# Patient Record
Sex: MALE | Race: WHITE | Employment: PART TIME | URBAN - METROPOLITAN AREA
[De-identification: names, ages, dates, MRNs, and addresses within clinical notes are randomized per-mention and may not be internally consistent; named-entity substitution may affect disease eponyms.]

---

## 2017-01-09 RX ORDER — HYDROCHLOROTHIAZIDE 25 MG/1
25 TABLET ORAL DAILY
Qty: 90 TAB | Refills: 3 | Status: SHIPPED | OUTPATIENT
Start: 2017-01-09 | End: 2017-01-11

## 2017-01-11 ENCOUNTER — OFFICE VISIT (OUTPATIENT)
Dept: CARDIOLOGY CLINIC | Age: 67
End: 2017-01-11

## 2017-01-11 VITALS
RESPIRATION RATE: 18 BRPM | SYSTOLIC BLOOD PRESSURE: 148 MMHG | DIASTOLIC BLOOD PRESSURE: 86 MMHG | OXYGEN SATURATION: 99 % | HEIGHT: 66 IN | WEIGHT: 209.2 LBS | BODY MASS INDEX: 33.62 KG/M2 | HEART RATE: 80 BPM

## 2017-01-11 DIAGNOSIS — I25.10 CORONARY ARTERY DISEASE INVOLVING NATIVE CORONARY ARTERY OF NATIVE HEART WITHOUT ANGINA PECTORIS: Primary | ICD-10-CM

## 2017-01-11 DIAGNOSIS — I10 ESSENTIAL HYPERTENSION, BENIGN: ICD-10-CM

## 2017-01-11 DIAGNOSIS — E78.5 DYSLIPIDEMIA: ICD-10-CM

## 2017-01-11 RX ORDER — AMLODIPINE BESYLATE 5 MG/1
5 TABLET ORAL DAILY
Qty: 30 TAB | Refills: 5 | Status: SHIPPED | OUTPATIENT
Start: 2017-01-11 | End: 2017-07-10 | Stop reason: SDUPTHER

## 2017-01-11 RX ORDER — HYDROCHLOROTHIAZIDE 25 MG/1
25 TABLET ORAL DAILY
COMMUNITY
End: 2018-01-08 | Stop reason: SDUPTHER

## 2017-01-11 RX ORDER — ATORVASTATIN CALCIUM 40 MG/1
TABLET, FILM COATED ORAL
COMMUNITY
End: 2017-12-27 | Stop reason: DRUGHIGH

## 2017-01-11 RX ORDER — AMLODIPINE BESYLATE 5 MG/1
5 TABLET ORAL DAILY
COMMUNITY
End: 2017-01-11 | Stop reason: SDUPTHER

## 2017-01-11 NOTE — PATIENT INSTRUCTIONS
AxioMx Activation    Thank you for requesting access to AxioMx. Please follow the instructions below to securely access and download your online medical record. AxioMx allows you to send messages to your doctor, view your test results, renew your prescriptions, schedule appointments, and more. How Do I Sign Up? 1. In your internet browser, go to [email or web address].[email or web address]  2. Click on the First Time User? Click Here link in the Sign In box. You will be redirect to the New Member Sign Up page. 3. Enter your AxioMx Access Code exactly as it appears below. You will not need to use this code after youve completed the sign-up process. If you do not sign up before the expiration date, you must request a new code. AxioMx Access Code: [email or web address]  Expires: 2017 10:49 AM (This is the date your AxioMx access code will )    4. Enter the last four digits of your Social Security Number (xxxx) and Date of Birth (mm/dd/yyyy) as indicated and click Submit. You will be taken to the next sign-up page. 5. Create a AxioMx ID. This will be your AxioMx login ID and cannot be changed, so think of one that is secure and easy to remember. 6. Create a AxioMx password. You can change your password at any time. 7. Enter your Password Reset Question and Answer. This can be used at a later time if you forget your password. 8. Enter your e-mail address. You will receive e-mail notification when new information is available in [address] 9. Click Sign Up. You can now view and download portions of your medical record. 10. Click the Download Summary menu link to download a portable copy of your medical information. Additional Information    If you have questions, please visit the Frequently Asked Questions section of the AxioMx website at https://[employer]. [employer]. Gotta'go Personal Care Device/[employer]/. Remember, AxioMx is NOT to be used for urgent needs. For medical emergencies, dial 911.            Learning About Coronary Artery Disease (CAD)  What is coronary artery disease? Coronary artery disease (CAD) occurs when plaque builds up in the arteries that bring oxygen-rich blood to your heart. Plaque is a fatty substance made of cholesterol, calcium, and other substances in the blood. This process is called hardening of the arteries, or atherosclerosis. What happens when you have coronary artery disease? · Plaque may narrow the coronary arteries. Narrowed arteries cause poor blood flow. This can lead to angina symptoms such as chest pain or discomfort. If blood flow is completely blocked, you could have a heart attack. · You can slow CAD and reduce the risk of future problems by making changes in your lifestyle. These include quitting smoking and eating heart-healthy foods. · Treatments for CAD, along with changes in your lifestyle, can help you live a longer and healthier life. How can you prevent coronary artery disease? · Do not smoke. It may be the best thing you can do to prevent heart disease. If you need help quitting, talk to your doctor about stop-smoking programs and medicines. These can increase your chances of quitting for good. · Be active. Get at least 30 minutes of exercise on most days of the week. Walking is a good choice. You also may want to do other activities, such as running, swimming, cycling, or playing tennis or team sports. · Eat heart-healthy foods. Eat more fruits and vegetables and less foods that contain saturated and trans fats. Limit alcohol, sodium, and sweets. · Stay at a healthy weight. Lose weight if you need to. · Manage other health problems such as diabetes, high blood pressure, and high cholesterol. · Manage stress. Stress can hurt your heart. To keep stress low, talk about your problems and feelings. Don't keep your feelings hidden. · If you have talked about it with your doctor, take a low-dose aspirin every day.  Aspirin can help certain people lower their risk of a heart attack or stroke. But taking aspirin isn't right for everyone, because it can cause serious bleeding. Do not start taking daily aspirin unless your doctor knows about it. How is coronary artery disease treated? · Your doctor will suggest that you make lifestyle changes. For example, your doctor may ask you to eat healthy foods, quit smoking, lose extra weight, and be more active. · You will have to take medicines. · Your doctor may suggest a procedure to open narrowed or blocked arteries. This is called angioplasty. Or your doctor may suggest using healthy blood vessels to create detours around narrowed or blocked arteries. This is called bypass surgery. Follow-up care is a key part of your treatment and safety. Be sure to make and go to all appointments, and call your doctor if you are having problems. It's also a good idea to know your test results and keep a list of the medicines you take. Where can you learn more? Go to http://raul-lotus.info/. Enter [phone] in the search box to learn more about \"Learning About Coronary Artery Disease (CAD). \"  Current as of: January 27, 2016  Content Version: 11.1  © 6955-2450 [employer]. Care instructions adapted under license by [employer] (which disclaims liability or warranty for this information). If you have questions about a medical condition or this instruction, always ask your healthcare professional. [address] any warranty or liability for your use of this information.

## 2017-01-11 NOTE — MR AVS SNAPSHOT
Visit Information Date & Time Provider Department Dept. Phone Encounter #  
 1/11/2017 11:40 AM Kamaljit Nick MD [facility] Brissa Roper [phone] [id number] Your Appointments 4/19/2017 10:40 AM  
ESTABLISHED PATIENT with Kamaljit Nick MD  
[facility] ([facility] SCHEDULING) Appt Note: 1 year f/u  
 [address] [facility] 600 1007 [address] [facility] [address] [address] Upcoming Health Maintenance Date Due Hepatitis C Screening 1950 DTaP/Tdap/Td series (1 - Tdap) 9/14/1969 FOBT Q 1 YEAR AGE 50-75 9/14/1998 ZOSTER VACCINE AGE 60> 9/14/2008 GLAUCOMA SCREENING Q2Y 9/14/2013 Pneumococcal 65+ Low/Medium Risk (1 of 2 - PCV13) 9/14/2013 MEDICARE YEARLY EXAM 9/14/2013 INFLUENZA AGE 9 TO ADULT 8/1/2016 Allergies as of 1/11/2017  Review Complete On: 1/11/2017 By: Kamaljit Nick MD  
 Not on File Current Immunizations  Never Reviewed No immunizations on file. Not reviewed this visit Vitals BP Pulse Resp Height(growth percentile) Weight(growth percentile) SpO2  
 148/86 (BP 1 Location: Left arm) 80 18 5' 6\" (1.676 m) 209 lb 3.2 oz (94.9 kg) 99% BMI Smoking Status 33.77 kg/m2 Former Smoker Vitals History BMI and BSA Data Body Mass Index Body Surface Area  
 33.77 kg/m 2 2.1 m 2 Preferred Pharmacy Pharmacy Name Phone [facility], [address] [phone] Your Updated Medication List  
  
   
This list is accurate as of: 1/11/17 12:03 PM.  Always use your most recent med list.  
  
  
  
  
 albuterol 2.5 mg /3 mL (0.083 %) nebulizer solution Commonly known as:  PROVENTIL VENTOLIN  
by Nebulization route as needed. amLODIPine 5 mg tablet Commonly known as:  Maria C Sal Take 1 Tab by mouth daily. aspirin 325 mg tablet Commonly known as:  ASPIRIN Take 325 mg by mouth daily. hydroCHLOROthiazide 25 mg tablet Commonly known as:  HYDRODIURIL Take 25 mg by mouth daily. LIPITOR 40 mg tablet Generic drug:  atorvastatin Take one tablet Monday, Wednesday and Friday. Introducing \[facility]\"" & HEALTH SERVICES! Vinicius Layne introduces Yuan[employer]~Flowâ„¢ patient portal. Now you can access parts of your medical record, email your doctor's office, and request medication refills online. 1. In your internet browser, go to [email or web address] Mobile 2. Click on the First Time User? Click Here link in the Sign In box. You will see the New Member Sign Up page. 3. Enter your Yuanfen~Flowâ„¢ Access Code exactly as it appears below. You will not need to use this code after youve completed the sign-up process. If you do not sign up before the expiration date, you must request a new code. · Yuanfen~Flowâ„¢ Access Code: [email or web address] Expires: 2/21/2017 10:49 AM 
 
4. Enter the last four digits of your Social Security Number (xxxx) and Date of Birth (mm/dd/yyyy) as indicated and click Submit. You will be taken to the next sign-up page. 5. Create a Yuanfen~Flowâ„¢ ID. This will be your Yuanfen~Flowâ„¢ login ID and cannot be changed, so think of one that is secure and easy to remember. 6. Create a Yuanfen~Flowâ„¢ password. You can change your password at any time. 7. Enter your Password Reset Question and Answer. This can be used at a later time if you forget your password. 8. Enter your e-mail address. You will receive e-mail notification when new information is available in [address] 9. Click Sign Up. You can now view and download portions of your medical record. 10. Click the Download Summary menu link to download a portable copy of your medical information. If you have questions, please visit the Frequently Asked Questions section of the Yuanfen~Flowâ„¢ website.  Remember, Yuanfen~Flowâ„¢ is NOT to be used for urgent needs. For medical emergencies, dial 911. Now available from your iPhone and Android! Please provide this summary of care documentation to your next provider. Your primary care clinician is listed as Angi Cruz. If you have any questions after today's visit, please call [phone].

## 2017-01-11 NOTE — PROGRESS NOTES
Subjective:     Problem List  Date Reviewed: 1/11/2017          Codes Class Noted    Coronary artery disease ICD-10-CM: I25.10  ICD-9-CM: 414.00  1/24/2011    Overview Addendum 1/7/2013  2:00 PM by MD yanely Harley. CABG (Dean Herron): LIMA-LAD, SVG-Diag, SVG-OM. b. Echo (4/1/9): EF 55%. C.  Echo (11/20/12):  EF 50-55%, pseudo. Clayborne Shank (12/2012): Fixed inferolat defect. No reversible defects. EF 57%. Dyslipidemia ICD-10-CM: E78.5  ICD-9-CM: 272.4  1/24/2011    Overview Addendum 7/16/2014 11:26 AM by MD yanely Harley. FLP (4/2/9): Tot 157, , HDL 55, LDL 78 (Vytorin 10/80 mg qd). B.  FLP (11/11/10): Tot 141, , HDL 59, LDL 58 (Vytorin 10/80mg qd). C.  FLP (11/21/13): Tot 165, TG 74, HDL 42, LDL 82 (Lipitor 40). Essential hypertension, benign ICD-10-CM: I10  ICD-9-CM: 401.1  1/24/2011              Mr. Ruben Overton is a 74 y.o. man with the above past medical history, who presents for follow up of his coronary artery disease   He is doing well from a cardiac standpoint. He has noted that his blood pressure has been elevated for the past several months. It has been anywhere from 746 to 989 systolic. He denies any chest pain, chest discomfort, shortness of breath, dyspnea on exertion, orthopnea, paroxysmal nocturnal dyspnea, lower extremity swelling, palpitations, syncope or near syncope. He has just been getting a stress test done as it has been about 19 years since his bypass surgery. History   Smoking Status    Former Smoker    Years: 28.00    Quit date: 1/24/1970   Smokeless Tobacco    Not on file       Current Outpatient Prescriptions   Medication Sig Dispense Refill    atorvastatin (LIPITOR) 40 mg tablet Take one tablet Monday, Wednesday and Friday.  hydroCHLOROthiazide (HYDRODIURIL) 25 mg tablet Take 25 mg by mouth daily.       albuterol (PROVENTIL VENTOLIN) 2.5 mg /3 mL (0.083 %) nebulizer solution by Nebulization route as needed.  aspirin (ASPIRIN) 325 mg tablet Take 325 mg by mouth daily. Objective:     Visit Vitals    /86 (BP 1 Location: Left arm)    Pulse 80    Resp 18    Ht 5' 6\" (1.676 m)    Wt 209 lb 3.2 oz (94.9 kg)    SpO2 99%    BMI 33.77 kg/m2       HEENT Exam:     Normocephalic, atraumatic. EOMI. Oropharynx negative. Neck supple. No lymphadenopathy. Lung Exam:     The patient is not dyspneic. There is no cough. The lungs are clear to percussion. Breath sounds are heard equally in all lung fields. There are no wheezes, rales, rhonchi, or rubs heard on auscultation. Heart Exam:     The rhythm is regular. The PMI is in the 5th intercostal space of the MCL. Apical impulse is normal. S1 is regular. S2 is physiologic. There is no S3, S4 gallop, murmur, click, or rub. Abdomen Exam:     Bowel sounds are normoactive. Abdomen benign. Extremities Exam:     The extremities are atraumatic appearing. There is no clubbing, cyanosis, edema, ulcers, varicose veins, rash, swelling, erythemia noted in the extremities. The neurovascular status is grossly intact with normal distal sensation and pulses. Vascular Exam:     The radial, brachial, dorsalis pedis, posterior tibial, are equal and strong bilaterally The carotids are equal bilaterally without bruits. EKG: Catrachito Da Silva Assessment/Plan:     Mr. Michaelle Redmond appears stable from a cardiac standpoint. He is almost two decades out from his bypass surgery so we are going to try to obtain an exercise Cardiolite Nuclear Stress Test to ensure he does not have any large areas of ischemia. We are also going to start him on Norvasc 5 mg per day. He is going to follow up with me in April at his regularly scheduled appointment. He will keep a blood pressure diary at home and call me with the results, as well as bring them in if and when he has the stress test.  We discussed diet and exercise. Plan:  1.  Continue outpatient medication regimen. 2. Start Norvasc 5 mg per day. 3. Exercise Cardiolite Nuclear Stress Test.   4. Follow up with me in April or sooner pending the results of #3.  5. Diet and exercise. 6. Call my office, call his primary care physician, or return to the hospital should any concerning symptomatology arise. Mr. Raul Mendoza indicated that he understood this plan and wished to proceed ahead.             Patient Care Team:  Leighann Gentile MD as PCP - General (Internal Medicine)  Modesto Giron MD as Physician (Cardiology)

## 2017-01-18 ENCOUNTER — TELEPHONE (OUTPATIENT)
Dept: CARDIOLOGY CLINIC | Age: 67
End: 2017-01-18

## 2017-01-18 NOTE — TELEPHONE ENCOUNTER
Please return call at [phone]. When pt calls, please inform him NPO after 6am Thurs Jan 19, 2017, no caffiene of any kind after 8pm this evening, and test is approximately 3-4 hours.

## 2017-01-19 ENCOUNTER — CLINICAL SUPPORT (OUTPATIENT)
Dept: CARDIOLOGY CLINIC | Age: 67
End: 2017-01-19

## 2017-01-19 DIAGNOSIS — I10 ESSENTIAL HYPERTENSION, BENIGN: ICD-10-CM

## 2017-01-19 DIAGNOSIS — E78.5 DYSLIPIDEMIA: ICD-10-CM

## 2017-01-19 DIAGNOSIS — I25.10 CORONARY ARTERY DISEASE INVOLVING NATIVE CORONARY ARTERY OF NATIVE HEART WITHOUT ANGINA PECTORIS: Primary | ICD-10-CM

## 2017-01-19 NOTE — PROGRESS NOTES
See scanned report. Dr. Amberly Brannon ordered and Dr. Amberly Brannon read study. ID verified per protocol.

## 2017-04-14 ENCOUNTER — DOCUMENTATION ONLY (OUTPATIENT)
Dept: CARDIOLOGY CLINIC | Age: 67
End: 2017-04-14

## 2017-07-10 RX ORDER — AMLODIPINE BESYLATE 5 MG/1
5 TABLET ORAL DAILY
Qty: 30 TAB | Refills: 3 | Status: SHIPPED | OUTPATIENT
Start: 2017-07-10 | End: 2017-10-09 | Stop reason: SDUPTHER

## 2017-07-10 NOTE — TELEPHONE ENCOUNTER
Refill is per verbal order of Dr. Yifan Martinez. Requested Prescriptions     Pending Prescriptions Disp Refills    amLODIPine (NORVASC) 5 mg tablet [Pharmacy Med Name: amLODIPine BESYLATE 5 MG TAB] 30 Tab 3     Sig: Take 1 Tab by mouth daily.

## 2017-10-09 RX ORDER — AMLODIPINE BESYLATE 5 MG/1
5 TABLET ORAL DAILY
Qty: 30 TAB | Refills: 3 | Status: SHIPPED | OUTPATIENT
Start: 2017-10-09 | End: 2018-01-08 | Stop reason: SDUPTHER

## 2017-12-08 ENCOUNTER — OFFICE VISIT (OUTPATIENT)
Dept: CARDIOLOGY CLINIC | Age: 67
End: 2017-12-08

## 2017-12-08 VITALS
RESPIRATION RATE: 18 BRPM | OXYGEN SATURATION: 98 % | SYSTOLIC BLOOD PRESSURE: 128 MMHG | WEIGHT: 210 LBS | BODY MASS INDEX: 33.75 KG/M2 | DIASTOLIC BLOOD PRESSURE: 60 MMHG | HEIGHT: 66 IN

## 2017-12-08 DIAGNOSIS — I25.10 CORONARY ARTERY DISEASE INVOLVING NATIVE CORONARY ARTERY OF NATIVE HEART WITHOUT ANGINA PECTORIS: Primary | ICD-10-CM

## 2017-12-08 DIAGNOSIS — I10 ESSENTIAL HYPERTENSION, BENIGN: ICD-10-CM

## 2017-12-08 DIAGNOSIS — E78.5 DYSLIPIDEMIA: ICD-10-CM

## 2017-12-08 NOTE — MR AVS SNAPSHOT
Visit Information Date & Time Provider Department Dept. Phone Encounter #  
 12/8/2017  3:00 PM John Lobo MD [facility] Debi Anderson [phone] [id number] Your Appointments 12/12/2018  8:40 AM  
ESTABLISHED PATIENT with John Lobo MD  
[facility] ([facility] SCHEDULING) Appt Note: annual  
 [address] [facility] [place] 600 1007 [facility]  
[address] [facility] [place] [address] [address] Upcoming Health Maintenance Date Due Hepatitis C Screening 1950 DTaP/Tdap/Td series (1 - Tdap) 9/14/1969 FOBT Q 1 YEAR AGE 50-75 9/14/1998 ZOSTER VACCINE AGE 60> 7/14/2008 GLAUCOMA SCREENING Q2Y 9/14/2013 Pneumococcal 65+ Low/Medium Risk (1 of 2 - PCV13) 9/14/2013 MEDICARE YEARLY EXAM 9/14/2013 Influenza Age 5 to Adult 8/1/2017 Allergies as of 12/8/2017  Review Complete On: 12/8/2017 By: John Lobo MD  
 No Known Allergies Current Immunizations  Never Reviewed No immunizations on file. Not reviewed this visit You Were Diagnosed With   
  
 Codes Comments Coronary artery disease involving native coronary artery of native heart without angina pectoris    -  Primary ICD-10-CM: I25.10 ICD-9-CM: 414.01 Vitals BP Resp Height(growth percentile) Weight(growth percentile) SpO2 BMI  
 128/60 (BP 1 Location: Left arm, BP Patient Position: Sitting) 18 5' 6\" (1.676 m) 210 lb (95.3 kg) 98% 33.89 kg/m2 Smoking Status Former Smoker Vitals History BMI and BSA Data Body Mass Index Body Surface Area  
 33.89 kg/m 2 2.11 m 2 Preferred Pharmacy Pharmacy Name Phone [facility], [address] [phone] Your Updated Medication List  
  
   
This list is accurate as of: 12/8/17  3:34 PM.  Always use your most recent med list.  
  
  
  
  
 albuterol 2.5 mg /3 mL (0.083 %) nebulizer solution Commonly known as:  PROVENTIL VENTOLIN  
by Nebulization route as needed. amLODIPine 5 mg tablet Commonly known as:  [place] Take 1 Tab by mouth daily. aspirin 325 mg tablet Commonly known as:  ASPIRIN Take 325 mg by mouth daily. hydroCHLOROthiazide 25 mg tablet Commonly known as:  HYDRODIURIL Take 25 mg by mouth daily. LIPITOR 40 mg tablet Generic drug:  atorvastatin Take one tablet Monday, Wednesday and Friday. We Performed the Following AMB POC EKG ROUTINE W/ 12 LEADS, INTER & REP [27500 CPT(R)] Introducing [facility] & HEALTH SERVICES! Corine Espinoza introduces Immunexpress patient portal. Now you can access parts of your medical record, email your doctor's office, and request medication refills online. 1. In your internet browser, go to [email or web address] 2. Click on the First Time User? Click Here link in the Sign In box. You will see the New Member Sign Up page. 3. Enter your Immunexpress Access Code exactly as it appears below. You will not need to use this code after youve completed the sign-up process. If you do not sign up before the expiration date, you must request a new code. · Immunexpress Access Code: [email or web address] Expires: 3/8/2018  3:34 PM 
 
4. Enter the last four digits of your Social Security Number (xxxx) and Date of Birth (mm/dd/yyyy) as indicated and click Submit. You will be taken to the next sign-up page. 5. Create a "[employer]t ID. This will be your Immunexpress login ID and cannot be changed, so think of one that is secure and easy to remember. 6. Create a Immunexpress password. You can change your password at any time. 7. Enter your Password Reset Question and Answer. This can be used at a later time if you forget your password. 8. Enter your e-mail address. You will receive e-mail notification when new information is available in [address] 9. Click Sign Up. You can now view and download portions of your medical record. 10. Click the Download Summary menu link to download a portable copy of your medical information. If you have questions, please visit the Frequently Asked Questions section of the EquityLancer website. Remember, EquityLancer is NOT to be used for urgent needs. For medical emergencies, dial 911. Now available from your iPhone and Android! Please provide this summary of care documentation to your next provider. Your primary care clinician is listed as Shwetha Hall. If you have any questions after today's visit, please call [phone].

## 2017-12-08 NOTE — PATIENT INSTRUCTIONS
Rocketboom Activation    Thank you for requesting access to Rocketboom. Please follow the instructions below to securely access and download your online medical record. Rocketboom allows you to send messages to your doctor, view your test results, renew your prescriptions, schedule appointments, and more. How Do I Sign Up? 1. In your internet browser, go to [email or web address]  2. Click on the First Time User? Click Here link in the Sign In box. You will be redirect to the New Member Sign Up page. 3. Enter your Rocketboom Access Code exactly as it appears below. You will not need to use this code after youve completed the sign-up process. If you do not sign up before the expiration date, you must request a new code. Rocketboom Access Code: [email or web address]  Expires: 3/8/2018  3:34 PM (This is the date your Rocketboom access code will )    4. Enter the last four digits of your Social Security Number (xxxx) and Date of Birth (mm/dd/yyyy) as indicated and click Submit. You will be taken to the next sign-up page. 5. Create a Rocketboom ID. This will be your Rocketboom login ID and cannot be changed, so think of one that is secure and easy to remember. 6. Create a Rocketboom password. You can change your password at any time. 7. Enter your Password Reset Question and Answer. This can be used at a later time if you forget your password. 8. Enter your e-mail address. You will receive e-mail notification when new information is available in [address]. 9. Click Sign Up. You can now view and download portions of your medical record. 10. Click the Download Summary menu link to download a portable copy of your medical information. Additional Information    If you have questions, please visit the Frequently Asked Questions section of the Rocketboom website at [email or web address] [employer]. [employer]Get[employer]NEUWAY [employer]/. Remember, Rocketboom is NOT to be used for urgent needs. For medical emergencies, dial 911.            Learning About Coronary Artery Disease (CAD)  What is coronary artery disease? Coronary artery disease (CAD) occurs when plaque builds up in the arteries that bring oxygen-rich blood to your heart. Plaque is a fatty substance made of cholesterol, calcium, and other substances in the blood. This process is called hardening of the arteries, or atherosclerosis. What happens when you have coronary artery disease? · Plaque may narrow the coronary arteries. Narrowed arteries cause poor blood flow. This can lead to angina symptoms such as chest pain or discomfort. If blood flow is completely blocked, you could have a heart attack. · You can slow CAD and reduce the risk of future problems by making changes in your lifestyle. These include quitting smoking and eating heart-healthy foods. · Treatments for CAD, along with changes in your lifestyle, can help you live a longer and healthier life. How can you prevent coronary artery disease? · Do not smoke. It may be the best thing you can do to prevent heart disease. If you need help quitting, talk to your doctor about stop-smoking programs and medicines. These can increase your chances of quitting for good. · Be active. Get at least 30 minutes of exercise on most days of the week. Walking is a good choice. You also may want to do other activities, such as running, swimming, cycling, or playing tennis or team sports. · Eat heart-healthy foods. Eat more fruits and vegetables and less foods that contain saturated and trans fats. Limit alcohol, sodium, and sweets. · Stay at a healthy weight. Lose weight if you need to. · Manage other health problems such as diabetes, high blood pressure, and high cholesterol. · Manage stress. Stress can hurt your heart. To keep stress low, talk about your problems and feelings. Don't keep your feelings hidden. · If you have talked about it with your doctor, take a low-dose aspirin every day.  Aspirin can help certain people lower their risk of a heart attack or stroke. But taking aspirin isn't right for everyone, because it can cause serious bleeding. Do not start taking daily aspirin unless your doctor knows about it. How is coronary artery disease treated? · Your doctor will suggest that you make lifestyle changes. For example, your doctor may ask you to eat healthy foods, quit smoking, lose extra weight, and be more active. · You will have to take medicines. · Your doctor may suggest a procedure to open narrowed or blocked arteries. This is called angioplasty. Or your doctor may suggest using healthy blood vessels to create detours around narrowed or blocked arteries. This is called bypass surgery. Follow-up care is a key part of your treatment and safety. Be sure to make and go to all appointments, and call your doctor if you are having problems. It's also a good idea to know your test results and keep a list of the medicines you take. Where can you learn more? Go to http://raul-lotus.info/. Enter [phone] in the search box to learn more about \"Learning About Coronary Artery Disease (CAD). \"  Current as of: September 21, 2016  Content Version: 11.4  © 8600-5127 [employer]. Care instructions adapted under license by [employer] (which disclaims liability or warranty for this information). If you have questions about a medical condition or this instruction, always ask your healthcare professional. [address] any warranty or liability for your use of this information.

## 2017-12-08 NOTE — PROGRESS NOTES
Isis Rich is a 69 y.o. male  Chief Complaint   Patient presents with    Coronary Artery Disease     .

## 2017-12-08 NOTE — PROGRESS NOTES
Subjective:     Problem List  Date Reviewed: 12/8/2017          Codes Class Noted    Coronary artery disease ICD-10-CM: I25.10  ICD-9-CM: 414.00  1/24/2011    Overview Addendum 1/23/2017 12:52 PM by Alisha Carvalho MD     a. CABG (Jelly Cabrera): LIMA-LAD, SVG-Diag, SVG-OM. b. Echo (4/1/9): EF 55%. C.  Echo (11/20/12):  EF 50-55%, pseudo. Alicia Rajas (12/2012): Fixed inferolat defect. No reversible defects. EF 57%. E.  Exercise Cardiolite (1/19/17): Large fixed inferolateral defect. No reversible defects. EF 53%. Dyslipidemia ICD-10-CM: E78.5  ICD-9-CM: 272.4  1/24/2011    Overview Addendum 7/16/2014 11:26 AM by Alisha Carvalho MD     a. FLP (4/2/9): Tot 157, , HDL 55, LDL 78 (Vytorin 10/80 mg qd). B.  FLP (11/11/10): Tot 141, , HDL 59, LDL 58 (Vytorin 10/80mg qd). C.  FLP (11/21/13): Tot 165, TG 74, HDL 42, LDL 82 (Lipitor 40). Essential hypertension, benign ICD-10-CM: I10  ICD-9-CM: 401.1  1/24/2011              Mr. Chuck Park is a 69 y.o. man with the above past medical history, who presents for follow up of his coronary artery disease. He is doing well from a cardiac standpoint. He denies any chest pain, chest discomfort, shortness of breath, dyspnea on exertion, orthopnea, paroxysmal nocturnal dyspnea, lower extremity swelling, palpitations, syncope or near syncope. His most recent lipids that I have are from March of this year. His LDL is suboptimal on his current regimen of Lipitor 40 mg Monday, Wednesday and Friday. History   Smoking Status    Former Smoker    Years: 28.00    Quit date: 1/24/1970   Smokeless Tobacco    Former User       Current Outpatient Prescriptions   Medication Sig Dispense Refill    amLODIPine (NORVASC) 5 mg tablet Take 1 Tab by mouth daily. 30 Tab 3    atorvastatin (LIPITOR) 40 mg tablet Take one tablet Monday, Wednesday and Friday.       hydroCHLOROthiazide (HYDRODIURIL) 25 mg tablet Take 25 mg by mouth daily.      albuterol (PROVENTIL VENTOLIN) 2.5 mg /3 mL (0.083 %) nebulizer solution by Nebulization route as needed.  aspirin (ASPIRIN) 325 mg tablet Take 325 mg by mouth daily. Objective:     Visit Vitals    /60 (BP 1 Location: Left arm, BP Patient Position: Sitting)    Resp 18    Ht 5' 6\" (1.676 m)    Wt 210 lb (95.3 kg)    SpO2 98%    BMI 33.89 kg/m2       HEENT Exam:     Normocephalic, atraumatic. EOMI. Oropharynx negative. Neck supple. No lymphadenopathy. Lung Exam:     The patient is not dyspneic. There is no cough. The lungs are clear to percussion. Breath sounds are heard equally in all lung fields. There are no wheezes, rales, rhonchi, or rubs heard on auscultation. Heart Exam:     The rhythm is regular. The PMI is in the 5th intercostal space of the MCL. Apical impulse is normal. S1 is regular. S2 is physiologic. There is no S3, S4 gallop, murmur, click, or rub. Abdomen Exam:     Bowel sounds are normoactive. Abdomen benign. Extremities Exam:     The extremities are atraumatic appearing. There is no clubbing, cyanosis, edema, ulcers, varicose veins, rash, erythemia noted in the extremities. The neurovascular status is grossly intact with normal distal sensation and pulses. Vascular Exam:     The radial, brachial, dorsalis pedis, posterior tibial, are equal and strong bilaterally The carotids are equal bilaterally without bruits. EKG: NSR @ 61, rSR' V1. Assessment/Plan:     Mr. Yancy Cabrera appears stable from a cardiac standpoint. We are going to recheck his fasting lipid profile with liver enzymes. If his LDL is still elevated we may try changing his Lipitor regimen to every day by changing him to Crestor at a lower dose. but every day. He will follow up in one year's time. He is to follow up with his primary care physician in March. We discussed diet and exercise. Plan:  1. Continue outpatient medication regimen. 2. Fasting lipid profile with liver enzymes. 3. Follow up in one year's time or sooner pending the results of #2.  4. Diet and exercise. 5. Call my office, call his primary care physician, or return to the hospital should any concerning symptomatology arise. Mr. Bing Braden indicated that he understood this plan and wished to proceed ahead.             Patient Care Team:  Annamaria Ferrer MD as PCP - General (Internal Medicine)  Ellie Ferrell MD as Physician (Cardiology)

## 2017-12-13 DIAGNOSIS — I25.10 CORONARY ARTERY DISEASE DUE TO LIPID RICH PLAQUE: Primary | ICD-10-CM

## 2017-12-13 DIAGNOSIS — I25.83 CORONARY ARTERY DISEASE DUE TO LIPID RICH PLAQUE: Primary | ICD-10-CM

## 2017-12-14 LAB
ALBUMIN SERPL-MCNC: 4.7 G/DL (ref 3.6–4.8)
ALP SERPL-CCNC: 58 IU/L (ref 39–117)
ALT SERPL-CCNC: 29 IU/L (ref 0–44)
AST SERPL-CCNC: 29 IU/L (ref 0–40)
BILIRUB DIRECT SERPL-MCNC: 0.33 MG/DL (ref 0–0.4)
BILIRUB SERPL-MCNC: 1.3 MG/DL (ref 0–1.2)
CHOLEST SERPL-MCNC: 217 MG/DL (ref 100–199)
HDLC SERPL-MCNC: 91 MG/DL
INTERPRETATION, 910389: NORMAL
LDLC SERPL CALC-MCNC: 114 MG/DL (ref 0–99)
PROT SERPL-MCNC: 7.2 G/DL (ref 6–8.5)
TRIGL SERPL-MCNC: 60 MG/DL (ref 0–149)
VLDLC SERPL CALC-MCNC: 12 MG/DL (ref 5–40)

## 2017-12-14 NOTE — PROGRESS NOTES
LDL suboptimal despite Lipitor 40mg M/W/F. I'd recommend either changing to Lipitor every day or changing to Crestor 20mg every day. His choice. Either way, L&L after 2 months. Thanks.

## 2017-12-20 DIAGNOSIS — I25.10 CORONARY ARTERY DISEASE DUE TO LIPID RICH PLAQUE: Primary | ICD-10-CM

## 2017-12-20 DIAGNOSIS — I25.83 CORONARY ARTERY DISEASE DUE TO LIPID RICH PLAQUE: Primary | ICD-10-CM

## 2017-12-22 ENCOUNTER — TELEPHONE (OUTPATIENT)
Dept: CARDIOLOGY CLINIC | Age: 67
End: 2017-12-22

## 2017-12-22 NOTE — TELEPHONE ENCOUNTER
Pt calling in regards to his lab results. He wants to know what the  recommends as far as starting a full regimen of his cholesterol medicine, again. Please give him a call back @ [phone], okay to leave voicemail. Thanks!   Juan Manuel Irwin

## 2017-12-27 RX ORDER — ATORVASTATIN CALCIUM 40 MG/1
40 TABLET, FILM COATED ORAL DAILY
Qty: 30 TAB | Refills: 5 | Status: SHIPPED | OUTPATIENT
Start: 2017-12-27 | End: 2018-01-08 | Stop reason: SDUPTHER

## 2017-12-27 RX ORDER — ATORVASTATIN CALCIUM 40 MG/1
TABLET, FILM COATED ORAL DAILY
COMMUNITY
End: 2017-12-27 | Stop reason: SDUPTHER

## 2017-12-27 RX ORDER — ATORVASTATIN CALCIUM 40 MG/1
TABLET, FILM COATED ORAL
Status: CANCELLED | OUTPATIENT
Start: 2017-12-27

## 2018-03-29 LAB
ALBUMIN SERPL-MCNC: 4.6 G/DL (ref 3.6–4.8)
ALP SERPL-CCNC: 67 IU/L (ref 39–117)
ALT SERPL-CCNC: 28 IU/L (ref 0–44)
AST SERPL-CCNC: 35 IU/L (ref 0–40)
BILIRUB DIRECT SERPL-MCNC: 0.34 MG/DL (ref 0–0.4)
BILIRUB SERPL-MCNC: 1.4 MG/DL (ref 0–1.2)
CHOLEST SERPL-MCNC: 162 MG/DL (ref 100–199)
HDLC SERPL-MCNC: 69 MG/DL
INTERPRETATION, 910389: NORMAL
LDLC SERPL CALC-MCNC: 77 MG/DL (ref 0–99)
PROT SERPL-MCNC: 7 G/DL (ref 6–8.5)
TRIGL SERPL-MCNC: 79 MG/DL (ref 0–149)
VLDLC SERPL CALC-MCNC: 16 MG/DL (ref 5–40)

## 2018-12-19 ENCOUNTER — OFFICE VISIT (OUTPATIENT)
Dept: CARDIOLOGY CLINIC | Age: 68
End: 2018-12-19

## 2018-12-19 ENCOUNTER — TELEPHONE (OUTPATIENT)
Dept: CARDIOLOGY CLINIC | Age: 68
End: 2018-12-19

## 2018-12-19 VITALS
OXYGEN SATURATION: 98 % | HEIGHT: 66 IN | DIASTOLIC BLOOD PRESSURE: 88 MMHG | BODY MASS INDEX: 33.91 KG/M2 | RESPIRATION RATE: 16 BRPM | WEIGHT: 211 LBS | SYSTOLIC BLOOD PRESSURE: 168 MMHG | HEART RATE: 58 BPM

## 2018-12-19 DIAGNOSIS — I25.10 CORONARY ARTERY DISEASE INVOLVING NATIVE CORONARY ARTERY OF NATIVE HEART WITHOUT ANGINA PECTORIS: Primary | ICD-10-CM

## 2018-12-19 RX ORDER — GUAIFENESIN 100 MG/5ML
81 LIQUID (ML) ORAL DAILY
Qty: 30 TAB | Refills: 3
Start: 2018-12-19

## 2018-12-19 RX ORDER — CARVEDILOL 6.25 MG/1
6.25 TABLET ORAL 2 TIMES DAILY WITH MEALS
Qty: 180 TAB | Refills: 1 | Status: SHIPPED | OUTPATIENT
Start: 2018-12-19 | End: 2020-03-10 | Stop reason: ALTCHOICE

## 2018-12-19 NOTE — PROGRESS NOTES
Chief Complaint   Patient presents with    Coronary Artery Disease     Patient presents today for annual visit.     Hypertension     Visit Vitals  Pulse (!) 58   Resp 16   Ht 5' 6\" (1.676 m)   Wt 211 lb (95.7 kg)   SpO2 98%   BMI 34.06 kg/m²

## 2018-12-19 NOTE — TELEPHONE ENCOUNTER
Patient needs a 24 hour Holter Monitor per Dr. Solange Rodney office visit on 12/19/18 for Dx CAD.   Appointment is scheduled for 12/20/18 at 3:30pm.

## 2018-12-19 NOTE — PROGRESS NOTES
[facility] [address], [address] [facility], [address] [place] [address]    Office [phone],[facility] [phone]           Sandra Niño is a 77 y.o. male annual follow-up for coronary artery disease    Assessment/Recommendations:    Coronary artery disease status post CABG in 1998. LIMA-LAD, SVG-Diag, SVG-OM. -Recommend decrease aspirin dose to 81 mg daily  -Continue current statin dose, tolerating atorvastatin 40 mg daily    Hyperlipidemia-previously elevated increase atorvastatin 40 mg daily January 2018. Repeat lipids in March 2018 showed LDL 70s  -Plan to have repeat cholesterol testing with his primary care physician in January 2018  -Consider increasing dose or adding Zetia, if his LDL is not approaching 50 given history of coronary artery disease    PVCs-symptomatically, found on routine EKG today. -24-hour Holter to quantify PVCs  -Start low-dose beta-blocker therapy for PVC suppression  -Consider repeat stress testing and possible echocardiogram if he has high burden of PVCs. Hypertension-elevated over the last several months. Has been running from 140s-160s at home.  -Continue current regimen  -Addition of carvedilol 6.25 mg twice daily, also used for PVC suppression      Primary Care Physician- Pamela Caicedo MD    Follow-up 3 months    Subjective:  7year-old male with a history of coronary artery disease presents the office for follow-up visit. Last seen 1 year ago. Continues to do well, without any chest pain, shortness of breath. Routine EKG in the office today shows evidence of frequent PVCs. He has noted over the last several months an increase in his blood pressure. He has noted his systolic blood pressure to be between 140-160 over the last several months. He needs to use his hydrochlorothiazide and amlodipine. No other changes in his medical regiment.     Past Medical History:   Diagnosis Date    CAD (coronary artery disease)  Coronary artery disease 2011    Dyslipidemia     Essential hypertension     Essential hypertension, benign 2011        Past Surgical History:   Procedure Laterality Date    HX CORONARY ARTERY BYPASS GRAFT      HX ORTHOPAEDIC           Current Outpatient Medications:     aspirin 81 mg chewable tablet, Take 1 Tab by mouth daily. , Disp: 30 Tab, Rfl: 3    carvedilol (COREG) 6.25 mg tablet, Take 1 Tab by mouth two (2) times daily (with meals). , Disp: 180 Tab, Rfl: 1    hydroCHLOROthiazide (HYDRODIURIL) 25 mg tablet, Take 1 Tab by mouth daily. , Disp: 90 Tab, Rfl: 3    atorvastatin (LIPITOR) 40 mg tablet, Take 1 Tab by mouth daily. , Disp: 90 Tab, Rfl: 3    amLODIPine (NORVASC) 5 mg tablet, Take 1 Tab by mouth daily. , Disp: 90 Tab, Rfl: 3    albuterol (PROVENTIL VENTOLIN) 2.5 mg /3 mL (0.083 %) nebulizer solution, by Nebulization route as needed. , Disp: , Rfl:     No Known Allergies     Family History   Problem Relation Age of Onset    No Known Problems Mother        Social History     Tobacco Use    Smoking status: Former Smoker     Years: 28.00     Last attempt to quit: 1970     Years since quittin.9    Smokeless tobacco: Former User   Substance Use Topics    Alcohol use: Yes     Comment: 3-4 times weekly    Drug use: No       Review of Symptoms:  Pertinent Positive: Negative  Pertinent Negative: No chest pain shortness of breath orthopnea PND palpitations  All Other systems reviewed and are negative for a Comprehensive ROS (10+)    Physical Exam    Blood pressure 168/88, pulse (!) 58, resp. rate 16, height 5' 6\" (1.676 m), weight 211 lb (95.7 kg), SpO2 98 %. Constitutional:  well-developed and well-nourished. No distress. HENT: Normocephalic. Eyes: No scleral icterus. Neck:  Neck supple. No JVD present. Pulmonary/Chest: Effort normal and breath sounds normal. No respiratory distress, wheezes or rales. Cardiovascular: Normal rate, regular rhythm, S1 S2 .  Exam reveals no gallop and no friction rub. No murmur heard. No edema. Extremities:  Normal muscle tone  Abdominal:   No abnormal distension. Neurological:  Moving all extremities, cranial nerves appear grossly intact. Skin: Skin is not cold. Not diaphoretic. No erythema. Psychiatric:  Grossly normal mood and affect. Intact insight. Objective Data:       CABG (Myrna Dougals): LIMA-LAD, SVG-Diag, SVG-OM. Echo (4/1/9): EF 55%. Echo (11/20/12):  EF 50-55%, pseudo. Lexiscan Cardiolite (12/2012): Fixed inferolat defect. No reversible defects. EF 57%. Exercise Cardiolite (1/19/17): Large fixed inferolateral defect. No reversible defects.   EF 53%.     ECG 12/19/2018-sinus rhythm frequent PVCs         Deejay Thibodeaux, DO

## 2018-12-20 ENCOUNTER — CLINICAL SUPPORT (OUTPATIENT)
Dept: CARDIOLOGY CLINIC | Age: 68
End: 2018-12-20

## 2018-12-20 DIAGNOSIS — R00.2 PALPITATIONS: Primary | ICD-10-CM

## 2018-12-20 DIAGNOSIS — I25.10 CORONARY ARTERY DISEASE INVOLVING NATIVE CORONARY ARTERY OF NATIVE HEART WITHOUT ANGINA PECTORIS: ICD-10-CM

## 2018-12-20 NOTE — PROGRESS NOTES
Applied 24 hr holter per Dr Tommy Carballo dx: PVC. Pt has #[id number] & due back on 12/21. Chargeable visit.

## 2019-01-08 ENCOUNTER — TELEPHONE (OUTPATIENT)
Dept: CARDIOLOGY CLINIC | Age: 69
End: 2019-01-08

## 2019-01-09 RX ORDER — HYDROCHLOROTHIAZIDE 25 MG/1
25 TABLET ORAL DAILY
Qty: 90 TAB | Refills: 3 | Status: SHIPPED | OUTPATIENT
Start: 2019-01-09 | End: 2020-01-01

## 2019-01-09 RX ORDER — AMLODIPINE BESYLATE 5 MG/1
5 TABLET ORAL DAILY
Qty: 90 TAB | Refills: 3 | Status: SHIPPED | OUTPATIENT
Start: 2019-01-09 | End: 2020-01-01

## 2019-01-09 NOTE — TELEPHONE ENCOUNTER
Patient wore the 24hr Cardiac Holter Monitor 12/20 to 12/21/18. I have put the summary report on your desk for review. Please advise.

## 2019-02-08 RX ORDER — ATORVASTATIN CALCIUM 40 MG/1
40 TABLET, FILM COATED ORAL DAILY
Qty: 90 TAB | Refills: 3 | Status: SHIPPED | OUTPATIENT
Start: 2019-02-08 | End: 2020-02-18

## 2019-02-08 NOTE — TELEPHONE ENCOUNTER
Pt following up on refill request. He has checked with the pharmacy and they have not received prescription He states he will run out of medication on Sunday. He can be reached @ [phone]  He will also like a call back to talk to a nurse about why it takes about 2-3 calls to get his prescriptions sent to the pharmacy. Pharmacy confirmed.

## 2019-03-20 ENCOUNTER — OFFICE VISIT (OUTPATIENT)
Dept: CARDIOLOGY CLINIC | Age: 69
End: 2019-03-20

## 2019-03-20 VITALS
DIASTOLIC BLOOD PRESSURE: 68 MMHG | SYSTOLIC BLOOD PRESSURE: 142 MMHG | WEIGHT: 215.6 LBS | BODY MASS INDEX: 34.65 KG/M2 | HEIGHT: 66 IN | RESPIRATION RATE: 16 BRPM | OXYGEN SATURATION: 98 % | HEART RATE: 58 BPM

## 2019-03-20 DIAGNOSIS — I25.10 CORONARY ARTERY DISEASE INVOLVING NATIVE CORONARY ARTERY OF NATIVE HEART WITHOUT ANGINA PECTORIS: Primary | ICD-10-CM

## 2019-03-20 RX ORDER — ALBUTEROL SULFATE 90 UG/1
2 AEROSOL, METERED RESPIRATORY (INHALATION) AS NEEDED
COMMUNITY

## 2019-03-20 NOTE — PROGRESS NOTES
[facility] [address], [address] [facility], [address] [place] [address]    Office [phone],B [phone]           Terrell Miller is a 77 y.o. male annual follow-up for coronary artery disease    Assessment/Recommendations:    Coronary artery disease status post CABG in 1998. LIMA-LAD, SVG-Diag, SVG-OM. - aspirin dose to 81 mg daily  -continue goal-directed medical therapy including statin therapy. Hyperlipidemia-previously elevated increase atorvastatin 40 mg daily January 2018. Repeat lipids in March 2018 showed LDL 70s  -Dates his annual physical was coming up later in May or June. -Consider increasing dose or adding Zetia, if his LDL is not approaching 50 given history of coronary artery disease    PVCs-improved continue current beta-blocker therapy    Hypertension-highly elevated in the office today  -Continue current regimen. Can consider up titration of amlodipine therapy if he continues to have systolic blood pressures greater than 130 mmHg        Primary Care Physician- Alexis Winters MD    Follow-up 1 year, sooner as needed    Subjective:  70-year-old male with a history of coronary artery disease presents the office for follow-up visit. Last seen 1 year ago. Continues to do well, without any chest pain, shortness of breath. Palpitations have decreased with carvedilol therapy. Event monitor with 2% PVCs. Continues on current goal-directed medical therapy  without any significant changes.       Past Medical History:   Diagnosis Date    CAD (coronary artery disease)     Coronary artery disease 1/24/2011    Dyslipidemia     Essential hypertension     Essential hypertension, benign 1/24/2011        Past Surgical History:   Procedure Laterality Date    HX CORONARY ARTERY BYPASS GRAFT  1998    HX ORTHOPAEDIC           Current Outpatient Medications:     albuterol (PROVENTIL HFA, VENTOLIN HFA, PROAIR HFA) 90 mcg/actuation inhaler, Take 2 Puffs by inhalation. , Disp: , Rfl:     atorvastatin (LIPITOR) 40 mg tablet, Take 1 Tab by mouth daily. , Disp: 90 Tab, Rfl: 3    amLODIPine (NORVASC) 5 mg tablet, Take 1 Tab by mouth daily. , Disp: 90 Tab, Rfl: 3    hydroCHLOROthiazide (HYDRODIURIL) 25 mg tablet, Take 1 Tab by mouth daily. , Disp: 90 Tab, Rfl: 3    aspirin 81 mg chewable tablet, Take 1 Tab by mouth daily. , Disp: 30 Tab, Rfl: 3    carvedilol (COREG) 6.25 mg tablet, Take 1 Tab by mouth two (2) times daily (with meals). , Disp: 180 Tab, Rfl: 1    albuterol (PROVENTIL VENTOLIN) 2.5 mg /3 mL (0.083 %) nebulizer solution, by Nebulization route as needed. , Disp: , Rfl:     No Known Allergies     Family History   Problem Relation Age of Onset    No Known Problems Mother        Social History     Tobacco Use    Smoking status: Former Smoker     Years: 28.00     Last attempt to quit: 1970     Years since quittin.1    Smokeless tobacco: Former User   Substance Use Topics    Alcohol use: Yes     Comment: 3-4 times weekly    Drug use: No       Review of Symptoms:  Pertinent Positive: Negative  Pertinent Negative: No chest pain shortness of breath orthopnea PND palpitations  All Other systems reviewed and are negative for a Comprehensive ROS (10+)    Physical Exam    Blood pressure 142/68, pulse (!) 58, resp. rate 16, height 5' 6\" (1.676 m), weight 215 lb 9.6 oz (97.8 kg), SpO2 98 %. Constitutional:  well-developed and well-nourished. No distress. HENT: Normocephalic. Eyes: No scleral icterus. Neck:  Neck supple. No JVD present. Pulmonary/Chest: Effort normal and breath sounds normal. No respiratory distress, wheezes or rales. Cardiovascular: Normal rate, regular rhythm, S1 S2 . Exam reveals no gallop and no friction rub. No murmur heard. No edema. Extremities:  Normal muscle tone  Abdominal:   No abnormal distension. Neurological:  Moving all extremities, cranial nerves appear grossly intact. Skin: Skin is not cold.   Not diaphoretic. No erythema. Psychiatric:  Grossly normal mood and affect. Intact insight. Objective Data:       CABG (Jah Miner): LIMA-LAD, SVG-Diag, SVG-OM. Echo (4/1/9): EF 55%. Echo (11/20/12):  EF 50-55%, pseudo. Lexiscan Cardiolite (12/2012): Fixed inferolat defect. No reversible defects. EF 57%. Exercise Cardiolite (1/19/17): Large fixed inferolateral defect. No reversible defects. EF 53%.     ECG 12/19/2018-sinus rhythm frequent PVCs     Event monitor 12/20/2018-rare isolated supraventricular beats, rare supraventricular pairs rare supraventricular runs. Isolated ventricular beats. 2% PVCs.     Trisha Marinelli DO

## 2019-03-20 NOTE — PROGRESS NOTES
Chief Complaint   Patient presents with    Follow-up     Patient presents today for a follow up visit    Coronary Artery Disease    Hypertension     Visit Vitals  /68 (BP 1 Location: Left arm, BP Patient Position: Sitting)   Pulse (!) 58   Resp 16   Ht 5' 6\" (1.676 m)   Wt 215 lb 9.6 oz (97.8 kg)   SpO2 98%   BMI 34.80 kg/m²

## 2020-01-01 RX ORDER — HYDROCHLOROTHIAZIDE 25 MG/1
TABLET ORAL
Qty: 90 TAB | Refills: 2 | Status: SHIPPED | OUTPATIENT
Start: 2020-01-01 | End: 2020-10-05

## 2020-01-01 RX ORDER — AMLODIPINE BESYLATE 5 MG/1
TABLET ORAL
Qty: 90 TAB | Refills: 2 | Status: SHIPPED | OUTPATIENT
Start: 2020-01-01 | End: 2020-03-09 | Stop reason: SDUPTHER

## 2020-02-17 ENCOUNTER — TELEPHONE (OUTPATIENT)
Dept: CARDIOLOGY CLINIC | Age: 70
End: 2020-02-17

## 2020-02-18 RX ORDER — ATORVASTATIN CALCIUM 40 MG/1
TABLET, FILM COATED ORAL
Qty: 90 TAB | Refills: 2 | Status: SHIPPED | OUTPATIENT
Start: 2020-02-18 | End: 2020-11-24

## 2020-03-09 ENCOUNTER — OFFICE VISIT (OUTPATIENT)
Dept: CARDIOLOGY CLINIC | Age: 70
End: 2020-03-09

## 2020-03-09 VITALS
HEIGHT: 66 IN | SYSTOLIC BLOOD PRESSURE: 148 MMHG | OXYGEN SATURATION: 96 % | WEIGHT: 208 LBS | DIASTOLIC BLOOD PRESSURE: 76 MMHG | BODY MASS INDEX: 33.43 KG/M2 | HEART RATE: 60 BPM

## 2020-03-09 DIAGNOSIS — I10 ESSENTIAL HYPERTENSION, BENIGN: ICD-10-CM

## 2020-03-09 DIAGNOSIS — Z01.818 PRE-OPERATIVE CLEARANCE: Primary | ICD-10-CM

## 2020-03-09 DIAGNOSIS — I25.10 CORONARY ARTERY DISEASE INVOLVING NATIVE CORONARY ARTERY OF NATIVE HEART WITHOUT ANGINA PECTORIS: ICD-10-CM

## 2020-03-09 DIAGNOSIS — I49.3 PVC'S (PREMATURE VENTRICULAR CONTRACTIONS): ICD-10-CM

## 2020-03-09 DIAGNOSIS — E78.5 DYSLIPIDEMIA: ICD-10-CM

## 2020-03-09 RX ORDER — AMLODIPINE BESYLATE 10 MG/1
10 TABLET ORAL DAILY
Qty: 90 TAB | Refills: 1 | Status: SHIPPED | OUTPATIENT
Start: 2020-03-09 | End: 2020-09-14

## 2020-03-09 NOTE — LETTER
3/10/20 Patient: Angelina Dupree YOB: 1950 Date of Visit: 3/9/2020 Yisel Palma MD 
[address] [facility] [address] [address] VIA Facsimile: [phone] Dear Yisel Palma MD, Thank you for referring Mr. Samir Shepherd to [address] [place] [address] for evaluation. My notes for this consultation are attached. If you have questions, please do not hesitate to call me. I look forward to following your patient along with you.  
 
 
Sincerely, 
 
Elza Rivera, DO

## 2020-03-09 NOTE — PROGRESS NOTES
[facility] [address], [address] [facility], [address] [place] [address]    Office [phone],[facility] [phone]           Damon Frederick is a 68 y.o. male presents for pre-operative exam for knee surgery    Assessment/Recommendations:      Pre-operative CV examination  Hx of CAD s/p remote CABG. Previously with normal LV function. No ongoing symptoms of angina nor heart failure. Recommend repeat echocardiogram prior to surgery. As long as echocardiogram shows perserved LV function, recommend patient proceed with orthopedic surgery. Surgeon- Dr. Cheko Loredo. He may hold asa 81mg for 5 days if needed    Addendum:  03/11/20   ECHO ADULT COMPLETE 03/11/2020 3/11/2020    Narrative · Normal cavity size and systolic function (ejection fraction normal). Increased wall thickness. Calculated left ventricular ejection fraction is   55%. Biplane method used to measure ejection fraction. Moderate (grade 2)   left ventricular diastolic dysfunction. · Mildly dilated right ventricle. · Aortic valve leaflet calcification present w/o stenosis. Trace aortic   valve regurgitation. · Mitral valve thickening. Mild mitral valve regurgitation is present. · Mildly dilated left atrium. · Mild pulmonic valve regurgitation is present. · Pulmonary arterial systolic pressure is 94.6 mmHg. Signed by: Russ Jurado DO     **no further CV testing prior to surgery    Coronary artery disease status post CABG in 1998. LIMA-LAD, SVG-Diag, SVG-OM. - aspirin dose to 81 mg daily  - continue goal-directed medical therapy including statin therapy. Hyperlipidemia- previously elevated, increased atorvastatin 40 mg, January 2018.   Repeat lipids in March 2018 showed LDL 70s  - obtain lipids from Kem Tillman MD  -Consider increasing dose or adding Zetia, if his LDL is not approaching 50 given history of coronary artery disease    PVCs-improved, he is no longer taking coreg therapy    Hypertension-  elevated in the office today  - recommend to titrate amlodipine to 10mg daily  - cont hctz      Primary Care Physician- Catarina Patino MD    Follow-up 3 months      Subjective:  68 y.o. male with a history of coronary artery disease presents to the office for follow-up visit. Last seen 1 year ago. Continues to do well, without any chest pain, shortness of breath. Continues on current goal-directed medical therapy  without any significant changes. He is scheduled to undergo right knee replacement with Dr. Niraj Hamilton with [facility]. Past Medical History:   Diagnosis Date    CAD (coronary artery disease)     Coronary artery disease 1/24/2011    Dyslipidemia     Essential hypertension     Essential hypertension, benign 1/24/2011        Past Surgical History:   Procedure Laterality Date    HX CORONARY ARTERY BYPASS GRAFT  1998    HX ORTHOPAEDIC           Current Outpatient Medications:     atorvastatin (LIPITOR) 40 mg tablet, TAKE ONE TABLET BY MOUTH DAILY, Disp: 90 Tab, Rfl: 2    hydroCHLOROthiazide (HYDRODIURIL) 25 mg tablet, TAKE ONE TABLET BY MOUTH DAILY, Disp: 90 Tab, Rfl: 2    amLODIPine (NORVASC) 5 mg tablet, TAKE ONE TABLET BY MOUTH DAILY, Disp: 90 Tab, Rfl: 2    albuterol (PROVENTIL HFA, VENTOLIN HFA, PROAIR HFA) 90 mcg/actuation inhaler, Take 2 Puffs by inhalation. , Disp: , Rfl:     aspirin 81 mg chewable tablet, Take 1 Tab by mouth daily. , Disp: 30 Tab, Rfl: 3    albuterol (PROVENTIL VENTOLIN) 2.5 mg /3 mL (0.083 %) nebulizer solution, by Nebulization route as needed. , Disp: , Rfl:     carvedilol (COREG) 6.25 mg tablet, Take 1 Tab by mouth two (2) times daily (with meals).  (Patient not taking: Reported on 3/9/2020), Disp: 180 Tab, Rfl: 1    No Known Allergies     Family History   Problem Relation Age of Onset    No Known Problems Mother        Social History     Tobacco Use    Smoking status: Former Smoker     Years: 28.00     Last attempt to quit: 1/24/1970 Years since quittin.1    Smokeless tobacco: Former User   Substance Use Topics    Alcohol use: Yes     Comment: 3-4 times weekly    Drug use: No       Review of Symptoms:  Pertinent Positive: Negative  Pertinent Negative: No chest pain shortness of breath orthopnea PND palpitations  All Other systems reviewed and are negative for a Comprehensive ROS (10+)    Physical Exam    Blood pressure 148/76, pulse 60, height 5' 6\" (1.676 m), weight 208 lb (94.3 kg), SpO2 96 %. Constitutional:  well-developed and well-nourished. No distress. HENT: Normocephalic. Eyes: No scleral icterus. Neck:  Neck supple. No JVD present. Pulmonary/Chest: Effort normal and breath sounds normal. No respiratory distress, wheezes or rales. Cardiovascular: Normal rate, regular rhythm, S1 S2 . Exam reveals no gallop and no friction rub. No murmur heard. No edema. Extremities:  Normal muscle tone  Abdominal:   No abnormal distension. Neurological:  Moving all extremities, cranial nerves appear grossly intact. Skin: Skin is not cold. Not diaphoretic. No erythema. Psychiatric:  Grossly normal mood and affect. Intact insight. Objective Data:       CABG ([facility]): LIMA-LAD, SVG-Diag, SVG-OM. Echo (): EF 55%. Echo (12):  EF 50-55%, pseudo. Lexiscan Cardiolite (2012): Fixed inferolat defect. No reversible defects. EF 57%. Exercise Cardiolite (17): Large fixed inferolateral defect. No reversible defects. EF 53%.     ECG 2018-sinus rhythm frequent PVCs     Event monitor 2018rare isolated supraventricular beats, rare supraventricular pairs rare supraventricular runs. Isolated ventricular beats. 2% PVCs.         Say Parada DO

## 2020-03-09 NOTE — PROGRESS NOTES
Ozzie Gandhi is a 68 y.o. male    Chief Complaint   Patient presents with    Coronary Artery Disease    Surgical Clearance     right knee partial knee replacement     Patient having surgery 3/27/2020 with  Dr Miguelina Boo. Chest pain No    SOB No    Dizziness No    Swelling No    Refills No    Visit Vitals  /76 (BP 1 Location: Left arm, BP Patient Position: Sitting)   Pulse 60   Ht 5' 6\" (1.676 m)   Wt 208 lb (94.3 kg)   SpO2 96%   BMI 33.57 kg/m²       1. Have you been to the ER, urgent care clinic since your last visit? Hospitalized since your last visit? no    2. Have you seen or consulted any other health care providers outside of the [address] [place] [address] since your last visit? Include any pap smears or colon screening.   no

## 2020-03-17 ENCOUNTER — TELEPHONE (OUTPATIENT)
Dept: CARDIOLOGY CLINIC | Age: 70
End: 2020-03-17

## 2020-03-18 NOTE — TELEPHONE ENCOUNTER
Called patient ID verified X2 reviewed below results per Dr Leopoldo Lawn. Echocardiogram is normal.     Patient verbalized understanding.

## 2020-06-19 ENCOUNTER — TELEPHONE (OUTPATIENT)
Dept: CARDIOLOGY CLINIC | Age: 70
End: 2020-06-19

## 2020-06-19 NOTE — TELEPHONE ENCOUNTER
Returned patient's call he is scheduled to undergo right knee replacement with  Dr. Linda Murray [facility] on 6/26/20. According to Dr Gabriela Reid office visit notes on 3/9/20 he may hold asa 81 mg for 5 days if needed.     Patient verbalized understanding.

## 2020-06-19 NOTE — TELEPHONE ENCOUNTER
Patient requesting to speak with the nurse in regards to stop taking his asparin before his procedure on 6/26 [facility]. Please advise      [facility]:[phone]

## 2020-08-31 ENCOUNTER — TELEPHONE (OUTPATIENT)
Dept: CARDIOLOGY CLINIC | Age: 70
End: 2020-08-31

## 2020-08-31 NOTE — TELEPHONE ENCOUNTER
Patient sates his full knee replacement surgery is being rescheduled and he will need updated clearance. He would like to know if he needs to come in and have OV or not. Please advise.      Phone: [phone]

## 2020-09-01 NOTE — TELEPHONE ENCOUNTER
Patient is requesting that you please fax a letter of clearance to his orthopedic Surgeon for him to proceed with his procedure. Dr. García Cage.      Fax: [phone]

## 2020-09-14 ENCOUNTER — TELEPHONE (OUTPATIENT)
Dept: CARDIOLOGY CLINIC | Age: 70
End: 2020-09-14

## 2020-09-14 RX ORDER — AMLODIPINE BESYLATE 10 MG/1
TABLET ORAL
Qty: 90 TAB | Refills: 0 | Status: SHIPPED | OUTPATIENT
Start: 2020-09-14 | End: 2020-12-18

## 2020-10-05 RX ORDER — HYDROCHLOROTHIAZIDE 25 MG/1
TABLET ORAL
Qty: 90 TAB | Refills: 1 | Status: SHIPPED | OUTPATIENT
Start: 2020-10-05 | End: 2021-04-05

## 2020-11-09 ENCOUNTER — OFFICE VISIT (OUTPATIENT)
Dept: CARDIOLOGY CLINIC | Age: 70
End: 2020-11-09
Payer: MEDICARE

## 2020-11-09 ENCOUNTER — ANCILLARY PROCEDURE (OUTPATIENT)
Dept: CARDIOLOGY CLINIC | Age: 70
End: 2020-11-09
Payer: MEDICARE

## 2020-11-09 VITALS
SYSTOLIC BLOOD PRESSURE: 140 MMHG | WEIGHT: 194 LBS | HEIGHT: 67 IN | BODY MASS INDEX: 30.45 KG/M2 | DIASTOLIC BLOOD PRESSURE: 82 MMHG

## 2020-11-09 VITALS
HEIGHT: 67 IN | DIASTOLIC BLOOD PRESSURE: 82 MMHG | HEART RATE: 60 BPM | OXYGEN SATURATION: 97 % | WEIGHT: 194 LBS | SYSTOLIC BLOOD PRESSURE: 140 MMHG | BODY MASS INDEX: 30.45 KG/M2

## 2020-11-09 DIAGNOSIS — I10 ESSENTIAL HYPERTENSION, BENIGN: ICD-10-CM

## 2020-11-09 DIAGNOSIS — R60.0 LOWER EXTREMITY EDEMA: ICD-10-CM

## 2020-11-09 DIAGNOSIS — Z98.890 S/P KNEE SURGERY: ICD-10-CM

## 2020-11-09 DIAGNOSIS — E78.5 DYSLIPIDEMIA: ICD-10-CM

## 2020-11-09 DIAGNOSIS — I25.10 CORONARY ARTERY DISEASE INVOLVING NATIVE CORONARY ARTERY OF NATIVE HEART WITHOUT ANGINA PECTORIS: Primary | ICD-10-CM

## 2020-11-09 PROCEDURE — G0463 HOSPITAL OUTPT CLINIC VISIT: HCPCS | Performed by: STUDENT IN AN ORGANIZED HEALTH CARE EDUCATION/TRAINING PROGRAM

## 2020-11-09 PROCEDURE — 99214 OFFICE O/P EST MOD 30 MIN: CPT | Performed by: STUDENT IN AN ORGANIZED HEALTH CARE EDUCATION/TRAINING PROGRAM

## 2020-11-09 PROCEDURE — 93971 EXTREMITY STUDY: CPT | Performed by: INTERNAL MEDICINE

## 2020-11-09 NOTE — PROGRESS NOTES
Rizwan Parada is a 65 y.o. male    Chief Complaint   Patient presents with    Follow-up     annual, PVC    Coronary Artery Disease    Cholesterol Problem    Hypertension     Patient had knee surgery 6 weeks ago. Chest pain No    SOB No    Dizziness No    Swelling No    Refills No    Visit Vitals  BP (!) 140/82 (BP 1 Location: Left arm, BP Patient Position: Sitting)   Pulse 60   Ht 5' 7\" (1.702 m)   Wt 194 lb (88 kg)   SpO2 97%   BMI 30.38 kg/m²       1. Have you been to the ER, urgent care clinic since your last visit? Hospitalized since your last visit? No    2. Have you seen or consulted any other health care providers outside of the [address] [place] [address] since your last visit? Include any pap smears or colon screening.   No

## 2020-11-09 NOTE — PROGRESS NOTES
[facility] [address], [address] [facility], [address] [place] [address]    Office [phone],[facility] [phone]           Ja Byrne is a 65 y.o. male presents for pre-operative exam for knee surgery    Assessment/Recommendations:      Coronary artery disease status post CABG in 1998. LIMA-LAD, SVG-Diag, SVG-OM. - aspirin dose to 81 mg daily  - continue goal-directed medical therapy including statin therapy. - lipids from Shirley Whitney MD    Hyperlipidemia- previously elevated, increased atorvastatin 40 mg, January 2018. Repeat lipids in March 2018 showed LDL 70s  - obtain lipids from Shirley Whitney MD  - Consider increasing dose or adding Zetia, if his LDL is not approaching 50 given history of coronary artery disease    PVCs-improved, he is no longer taking coreg therapy    Hypertension-  elevated in the office today  - cont amlodipine to 10mg daily  - cont hctz    Right LE swelling- s/p knee replacement 6 weeks ago. Venous duplex. If negative, recommend compression      Primary Care Physician- Shirley Whitney MD    Follow-up one year      Subjective:  65 y.o. male with a history of coronary artery disease presents to the office for follow-up visit. S/p klnee replacement 6 weeks ago. Ongoing right LE edema since surgery. Continues to do well, without any chest pain, shortness of breath. Continues on current goal-directed medical therapy  without any significant changes.         Past Medical History:   Diagnosis Date    CAD (coronary artery disease)     Coronary artery disease 1/24/2011    Dyslipidemia     Essential hypertension     Essential hypertension, benign 1/24/2011        Past Surgical History:   Procedure Laterality Date    HX CORONARY ARTERY BYPASS GRAFT  1998    HX ORTHOPAEDIC           Current Outpatient Medications:     hydroCHLOROthiazide (HYDRODIURIL) 25 mg tablet, TAKE ONE TABLET BY MOUTH DAILY, Disp: 90 Tab, Rfl: 1    amLODIPine (NORVASC) 10 mg tablet, TAKE ONE TABLET BY MOUTH DAILY, Disp: 90 Tab, Rfl: 0    atorvastatin (LIPITOR) 40 mg tablet, TAKE ONE TABLET BY MOUTH DAILY, Disp: 90 Tab, Rfl: 2    albuterol (PROVENTIL HFA, VENTOLIN HFA, PROAIR HFA) 90 mcg/actuation inhaler, Take 2 Puffs by inhalation. , Disp: , Rfl:     aspirin 81 mg chewable tablet, Take 1 Tab by mouth daily. , Disp: 30 Tab, Rfl: 3    albuterol (PROVENTIL VENTOLIN) 2.5 mg /3 mL (0.083 %) nebulizer solution, by Nebulization route as needed. , Disp: , Rfl:     No Known Allergies     Family History   Problem Relation Age of Onset    No Known Problems Mother        Social History     Tobacco Use    Smoking status: Former Smoker     Years: 28.00     Last attempt to quit: 1970     Years since quittin.8    Smokeless tobacco: Former User   Substance Use Topics    Alcohol use: Yes     Comment: 3-4 times weekly    Drug use: No       Review of Symptoms:  Pertinent Positive: Negative  Pertinent Negative: No chest pain shortness of breath orthopnea PND palpitations  All Other systems reviewed and are negative for a Comprehensive ROS (10+)    Physical Exam    Blood pressure (!) 140/82, pulse 60, height 5' 7\" (1.702 m), weight 194 lb (88 kg), SpO2 97 %. Constitutional:  well-developed and well-nourished. No distress. HENT: Normocephalic. Eyes: No scleral icterus. Neck:  Neck supple. No JVD present. Pulmonary/Chest: Effort normal and breath sounds normal. No respiratory distress, wheezes or rales. Cardiovascular: Normal rate, regular rhythm, S1 S2 . Exam reveals no gallop and no friction rub. No murmur heard. No edema. Extremities:  Normal muscle tone, right LE swelling through groin  Abdominal:   No abnormal distension. Neurological:  Moving all extremities, cranial nerves appear grossly intact. Skin: Skin is not cold. Not diaphoretic. No erythema. Psychiatric:  Grossly normal mood and affect. Intact insight.     Objective Data: CABG ([facility]): LIMA-LAD, SVG-Diag, SVG-OM. Echo (4/1/9): EF 55%. Echo (11/20/12):  EF 50-55%, pseudo. Lexiscan Cardiolite (12/2012): Fixed inferolat defect. No reversible defects. EF 57%. Exercise Cardiolite (1/19/17): Large fixed inferolateral defect. No reversible defects. EF 53%.     ECG 12/19/2018-sinus rhythm frequent PVCs     Event monitor 12/20/2018rare isolated supraventricular beats, rare supraventricular pairs rare supraventricular runs. Isolated ventricular beats. 2% PVCs.    03/11/20   ECHO ADULT COMPLETE 03/11/2020 3/11/2020    Narrative · Normal cavity size and systolic function (ejection fraction normal). Increased wall thickness. Calculated left ventricular ejection fraction is   55%. Biplane method used to measure ejection fraction. Moderate (grade 2)   left ventricular diastolic dysfunction. · Mildly dilated right ventricle. · Aortic valve leaflet calcification present w/o stenosis. Trace aortic   valve regurgitation. · Mitral valve thickening. Mild mitral valve regurgitation is present. · Mildly dilated left atrium. · Mild pulmonic valve regurgitation is present. · Pulmonary arterial systolic pressure is 90.4 mmHg.         Signed by: DO Nia Dove DO

## 2020-11-24 RX ORDER — ATORVASTATIN CALCIUM 40 MG/1
TABLET, FILM COATED ORAL
Qty: 90 TAB | Refills: 3 | Status: SHIPPED | OUTPATIENT
Start: 2020-11-24 | End: 2021-11-15

## 2020-12-18 RX ORDER — AMLODIPINE BESYLATE 10 MG/1
TABLET ORAL
Qty: 90 TAB | Refills: 3 | Status: SHIPPED | OUTPATIENT
Start: 2020-12-18 | End: 2021-12-17

## 2021-04-05 RX ORDER — HYDROCHLOROTHIAZIDE 25 MG/1
TABLET ORAL
Qty: 90 TAB | Refills: 2 | Status: SHIPPED | OUTPATIENT
Start: 2021-04-05 | End: 2021-12-28

## 2021-11-15 RX ORDER — ATORVASTATIN CALCIUM 40 MG/1
TABLET, FILM COATED ORAL
Qty: 90 TABLET | Refills: 3 | Status: SHIPPED | OUTPATIENT
Start: 2021-11-15 | End: 2022-01-04 | Stop reason: SDUPTHER

## 2021-12-17 RX ORDER — AMLODIPINE BESYLATE 10 MG/1
TABLET ORAL
Qty: 90 TABLET | Refills: 0 | Status: SHIPPED | OUTPATIENT
Start: 2021-12-17 | End: 2022-01-04 | Stop reason: SDUPTHER

## 2021-12-28 RX ORDER — HYDROCHLOROTHIAZIDE 25 MG/1
TABLET ORAL
Qty: 90 TABLET | Refills: 0 | Status: SHIPPED | OUTPATIENT
Start: 2021-12-28 | End: 2022-04-01

## 2022-01-04 ENCOUNTER — OFFICE VISIT (OUTPATIENT)
Dept: CARDIOLOGY CLINIC | Age: 72
End: 2022-01-04
Payer: MEDICARE

## 2022-01-04 VITALS
WEIGHT: 218 LBS | BODY MASS INDEX: 34.21 KG/M2 | SYSTOLIC BLOOD PRESSURE: 136 MMHG | OXYGEN SATURATION: 96 % | HEART RATE: 62 BPM | DIASTOLIC BLOOD PRESSURE: 78 MMHG | HEIGHT: 67 IN

## 2022-01-04 DIAGNOSIS — I25.10 CORONARY ARTERY DISEASE INVOLVING NATIVE CORONARY ARTERY OF NATIVE HEART WITHOUT ANGINA PECTORIS: Primary | ICD-10-CM

## 2022-01-04 DIAGNOSIS — I10 ESSENTIAL HYPERTENSION, BENIGN: ICD-10-CM

## 2022-01-04 DIAGNOSIS — E78.5 DYSLIPIDEMIA: ICD-10-CM

## 2022-01-04 PROCEDURE — G8536 NO DOC ELDER MAL SCRN: HCPCS | Performed by: STUDENT IN AN ORGANIZED HEALTH CARE EDUCATION/TRAINING PROGRAM

## 2022-01-04 PROCEDURE — G8417 CALC BMI ABV UP PARAM F/U: HCPCS | Performed by: STUDENT IN AN ORGANIZED HEALTH CARE EDUCATION/TRAINING PROGRAM

## 2022-01-04 PROCEDURE — G8427 DOCREV CUR MEDS BY ELIG CLIN: HCPCS | Performed by: STUDENT IN AN ORGANIZED HEALTH CARE EDUCATION/TRAINING PROGRAM

## 2022-01-04 PROCEDURE — 3017F COLORECTAL CA SCREEN DOC REV: CPT | Performed by: STUDENT IN AN ORGANIZED HEALTH CARE EDUCATION/TRAINING PROGRAM

## 2022-01-04 PROCEDURE — G8754 DIAS BP LESS 90: HCPCS | Performed by: STUDENT IN AN ORGANIZED HEALTH CARE EDUCATION/TRAINING PROGRAM

## 2022-01-04 PROCEDURE — G8752 SYS BP LESS 140: HCPCS | Performed by: STUDENT IN AN ORGANIZED HEALTH CARE EDUCATION/TRAINING PROGRAM

## 2022-01-04 PROCEDURE — G8510 SCR DEP NEG, NO PLAN REQD: HCPCS | Performed by: STUDENT IN AN ORGANIZED HEALTH CARE EDUCATION/TRAINING PROGRAM

## 2022-01-04 PROCEDURE — G0463 HOSPITAL OUTPT CLINIC VISIT: HCPCS | Performed by: STUDENT IN AN ORGANIZED HEALTH CARE EDUCATION/TRAINING PROGRAM

## 2022-01-04 PROCEDURE — 99214 OFFICE O/P EST MOD 30 MIN: CPT | Performed by: STUDENT IN AN ORGANIZED HEALTH CARE EDUCATION/TRAINING PROGRAM

## 2022-01-04 PROCEDURE — 1101F PT FALLS ASSESS-DOCD LE1/YR: CPT | Performed by: STUDENT IN AN ORGANIZED HEALTH CARE EDUCATION/TRAINING PROGRAM

## 2022-01-04 PROCEDURE — 93005 ELECTROCARDIOGRAM TRACING: CPT | Performed by: STUDENT IN AN ORGANIZED HEALTH CARE EDUCATION/TRAINING PROGRAM

## 2022-01-04 PROCEDURE — 93010 ELECTROCARDIOGRAM REPORT: CPT | Performed by: STUDENT IN AN ORGANIZED HEALTH CARE EDUCATION/TRAINING PROGRAM

## 2022-01-04 RX ORDER — AMLODIPINE BESYLATE 10 MG/1
10 TABLET ORAL DAILY
Qty: 90 TABLET | Refills: 3 | Status: SHIPPED | OUTPATIENT
Start: 2022-01-04

## 2022-01-04 RX ORDER — ATORVASTATIN CALCIUM 40 MG/1
40 TABLET, FILM COATED ORAL DAILY
Qty: 90 TABLET | Refills: 3 | Status: SHIPPED | OUTPATIENT
Start: 2022-01-04

## 2022-01-04 NOTE — PROGRESS NOTES
Sara Miller is a 66 y.o. male    Chief Complaint   Patient presents with    Follow-up     annual, edema     Coronary Artery Disease    Cholesterol Problem    Hypertension       Chest pain No    SOB No    Dizziness No    Swelling right leg at times during the day     Refills patient request 90 day with 3 refills on next refill     Visit Vitals  /78 (BP 1 Location: Left upper arm, BP Patient Position: Sitting)   Pulse 62   Ht 5' 7\" (1.702 m)   Wt 218 lb (98.9 kg)   SpO2 96%   BMI 34.14 kg/m²       1. Have you been to the ER, urgent care clinic since your last visit? Hospitalized since your last visit? No    2. Have you seen or consulted any other health care providers outside of the [address] [place] [address] since your last visit? Include any pap smears or colon screening.   No

## 2022-01-04 NOTE — PROGRESS NOTES
[facility] [address], [address] [facility], [address] [place] [address]    Office [phone],XXX [phone]           Maxine Chatterjee is a 66 y.o. male presents for f/up visit    Assessment/Recommendations:      Coronary artery disease status post CABG in 1998. LIMA-LAD, SVG-Diag, SVG-OM. - aspirin dose to 81 mg daily  - continue goal-directed medical therapy including statin therapy. - lipids from Natalia Martin MD    Hyperlipidemia- previously elevated, increased atorvastatin 40 mg, January 2018. Repeat lipids 6/21 with a total cholesterol of 183, triglycerides 50, HDL 84, LDL 89  -lipids followed by Natalia Martin MD    PVCs-improved, he is no longer taking coreg therapy    Hypertension-  stable  - cont amlodipine to 10mg daily  - cont hctz      Primary Care Physician- Natalia Martin MD    Follow-up one year      Subjective:  66 y.o. male with a history of coronary artery disease presents to the office for follow-up visit. Clinically doing very well from coronary artery disease standpoint. No ongoing chest pain or chest pressure symptoms. Remains active. Reports that he is working part-time at [employer] [place].   No adverse bleeding with aspirin    Past Medical History:   Diagnosis Date    CAD (coronary artery disease)     Coronary artery disease 1/24/2011    Dyslipidemia     Essential hypertension     Essential hypertension, benign 1/24/2011        Past Surgical History:   Procedure Laterality Date    HX CORONARY ARTERY BYPASS GRAFT  1998    HX ORTHOPAEDIC           Current Outpatient Medications:     hydroCHLOROthiazide (HYDRODIURIL) 25 mg tablet, TAKE ONE TABLET BY MOUTH DAILY, Disp: 90 Tablet, Rfl: 0    amLODIPine (NORVASC) 10 mg tablet, TAKE ONE TABLET BY MOUTH DAILY, Disp: 90 Tablet, Rfl: 0    atorvastatin (LIPITOR) 40 mg tablet, TAKE ONE TABLET BY MOUTH DAILY, Disp: 90 Tablet, Rfl: 3    albuterol (PROVENTIL HFA, VENTOLIN HFA, PROAIR HFA) 90 mcg/actuation inhaler, Take 2 Puffs by inhalation as needed. , Disp: , Rfl:     aspirin 81 mg chewable tablet, Take 1 Tab by mouth daily. , Disp: 30 Tab, Rfl: 3    albuterol (PROVENTIL VENTOLIN) 2.5 mg /3 mL (0.083 %) nebulizer solution, by Nebulization route as needed. , Disp: , Rfl:     No Known Allergies     Family History   Problem Relation Age of Onset    No Known Problems Mother        Social History     Tobacco Use    Smoking status: Former Smoker     Years: 28.00     Quit date: 1970     Years since quittin.9    Smokeless tobacco: Former User   Substance Use Topics    Alcohol use: Yes     Comment: 3-4 times weekly    Drug use: No       Review of Symptoms:  Pertinent Positive: Negative  Pertinent Negative: No chest pain shortness of breath orthopnea PND palpitations  All Other systems reviewed and are negative for a Comprehensive ROS (10+)    Physical Exam    Blood pressure 136/78, pulse 62, height 5' 7\" (1.702 m), weight 218 lb (98.9 kg), SpO2 96 %. Constitutional:  well-developed and well-nourished. No distress. HENT: Normocephalic. Eyes: No scleral icterus. Neck:  Neck supple. No JVD present. Pulmonary/Chest: Effort normal and breath sounds normal. No respiratory distress, wheezes or rales. Cardiovascular: Normal rate, regular rhythm, S1 S2 . Exam reveals no gallop and no friction rub. No murmur heard. No edema. Extremities:  Normal muscle tone, right LE swelling through groin  Abdominal:   No abnormal distension. Neurological:  Moving all extremities, cranial nerves appear grossly intact. Skin: Skin is not cold. Not diaphoretic. No erythema. Psychiatric:  Grossly normal mood and affect. Intact insight. Objective Data:       CABG ([facility]): LIMA-LAD, SVG-Diag, SVG-OM. Echo (): EF 55%. Echo (12):  EF 50-55%, pseudo. Lexiscan Cardiolite (2012): Fixed inferolat defect. No reversible defects. EF 57%.     Exercise Cardiolite (17): Large fixed inferolateral defect. No reversible defects. EF 53%.     ECG 12/19/2018-sinus rhythm frequent PVCs     Event monitor 12/20/2018rare isolated supraventricular beats, rare supraventricular pairs rare supraventricular runs. Isolated ventricular beats. 2% PVCs.    03/11/20   ECHO ADULT COMPLETE 03/11/2020 3/11/2020    Narrative · Normal cavity size and systolic function (ejection fraction normal). Increased wall thickness. Calculated left ventricular ejection fraction is   55%. Biplane method used to measure ejection fraction. Moderate (grade 2)   left ventricular diastolic dysfunction. · Mildly dilated right ventricle. · Aortic valve leaflet calcification present w/o stenosis. Trace aortic   valve regurgitation. · Mitral valve thickening. Mild mitral valve regurgitation is present. · Mildly dilated left atrium. · Mild pulmonic valve regurgitation is present. · Pulmonary arterial systolic pressure is 61.4 mmHg.         Signed by: DO Radha De, DO

## 2022-04-01 RX ORDER — HYDROCHLOROTHIAZIDE 25 MG/1
TABLET ORAL
Qty: 90 TABLET | Refills: 2 | Status: SHIPPED | OUTPATIENT
Start: 2022-04-01

## 2022-05-12 ENCOUNTER — ANESTHESIA EVENT (OUTPATIENT)
Dept: ENDOSCOPY | Age: 72
End: 2022-05-12
Payer: MEDICARE

## 2022-05-12 NOTE — PERIOP NOTES
[facility]  Endoscopy Preprocedure Instructions      1. On the day of your surgery, please report to registration located on the 2nd floor of the  [facility]. yes    2. You must have a responsible adult to drive you to the hospital, stay at the hospital during your procedure and drive you home. If they leave your procedure will not be started (no exceptions). yes    3. Do not have anything to eat or drink (including water, gum, mints, coffee, and juice) after midnight. This does not apply to the medications you were instructed to take by your physician. yesIf you are currently taking Plavix, Coumadin, Aspirin, or other blood-thinning agents, contact your physician for special instructions. Yes, asa.     4. If you are having a procedure that requires bowel prep: We recommend that you have only clear liquids the day before your procedure and begin your bowel prep by 5:00 pm.  You may continue to drink clear liquids until midnight. If for any reason you are not able to complete your prep please notify your physician immediately. yes    5. Have a list of all current medications, including vitamins, herbal supplements and any other over the counter medications. yes    6. If you wear glasses, contacts, dentures and/or hearing aids, they may be removed prior to procedure, please bring a case to store them in. Yes.     7. You should understand that if you do not follow these instructions your procedure may be cancelled. If your physical condition changes (I.e. fever, cold or flu) please contact your doctor as soon as possible. 8. It is important that you be on time.   If for any reason you are unable to keep your appointment please call [phone]  the day of or your physicians office prior to your scheduled procedure

## 2022-05-13 ENCOUNTER — HOSPITAL ENCOUNTER (OUTPATIENT)
Age: 72
Setting detail: OUTPATIENT SURGERY
Discharge: HOME | End: 2022-05-13
Attending: SPECIALIST | Admitting: SPECIALIST
Payer: MEDICARE

## 2022-05-13 ENCOUNTER — ANESTHESIA (OUTPATIENT)
Dept: ENDOSCOPY | Age: 72
End: 2022-05-13
Payer: MEDICARE

## 2022-05-13 VITALS
WEIGHT: 212.96 LBS | DIASTOLIC BLOOD PRESSURE: 68 MMHG | TEMPERATURE: 97.8 F | OXYGEN SATURATION: 97 % | RESPIRATION RATE: 12 BRPM | BODY MASS INDEX: 33.43 KG/M2 | SYSTOLIC BLOOD PRESSURE: 138 MMHG | HEIGHT: 67 IN | HEART RATE: 53 BPM

## 2022-05-13 PROCEDURE — 76060000031 HC ANESTHESIA FIRST 0.5 HR: Performed by: SPECIALIST

## 2022-05-13 PROCEDURE — 77030013992 HC SNR POLYP ENDOSC BSC -B: Performed by: SPECIALIST

## 2022-05-13 PROCEDURE — 74011000250 HC RX REV CODE- 250: Performed by: NURSE ANESTHETIST, CERTIFIED REGISTERED

## 2022-05-13 PROCEDURE — 74011250636 HC RX REV CODE- 250/636: Performed by: NURSE ANESTHETIST, CERTIFIED REGISTERED

## 2022-05-13 PROCEDURE — 76040000019: Performed by: SPECIALIST

## 2022-05-13 PROCEDURE — 88305 TISSUE EXAM BY PATHOLOGIST: CPT

## 2022-05-13 PROCEDURE — 2709999900 HC NON-CHARGEABLE SUPPLY: Performed by: SPECIALIST

## 2022-05-13 RX ORDER — NALOXONE HYDROCHLORIDE 0.4 MG/ML
0.4 INJECTION, SOLUTION INTRAMUSCULAR; INTRAVENOUS; SUBCUTANEOUS
Status: DISCONTINUED | OUTPATIENT
Start: 2022-05-13 | End: 2022-05-13 | Stop reason: HOSPADM

## 2022-05-13 RX ORDER — PROPOFOL 10 MG/ML
INJECTION, EMULSION INTRAVENOUS
Status: DISCONTINUED | OUTPATIENT
Start: 2022-05-13 | End: 2022-05-13 | Stop reason: HOSPADM

## 2022-05-13 RX ORDER — LIDOCAINE HYDROCHLORIDE 20 MG/ML
INJECTION, SOLUTION EPIDURAL; INFILTRATION; INTRACAUDAL; PERINEURAL AS NEEDED
Status: DISCONTINUED | OUTPATIENT
Start: 2022-05-13 | End: 2022-05-13 | Stop reason: HOSPADM

## 2022-05-13 RX ORDER — DEXTROMETHORPHAN/PSEUDOEPHED 2.5-7.5/.8
1.2 DROPS ORAL
Status: DISCONTINUED | OUTPATIENT
Start: 2022-05-13 | End: 2022-05-13 | Stop reason: HOSPADM

## 2022-05-13 RX ORDER — PROPOFOL 10 MG/ML
INJECTION, EMULSION INTRAVENOUS AS NEEDED
Status: DISCONTINUED | OUTPATIENT
Start: 2022-05-13 | End: 2022-05-13 | Stop reason: HOSPADM

## 2022-05-13 RX ORDER — SODIUM CHLORIDE 9 MG/ML
50 INJECTION, SOLUTION INTRAVENOUS CONTINUOUS
Status: DISCONTINUED | OUTPATIENT
Start: 2022-05-13 | End: 2022-05-13 | Stop reason: HOSPADM

## 2022-05-13 RX ORDER — MIDAZOLAM HYDROCHLORIDE 1 MG/ML
.25-5 INJECTION, SOLUTION INTRAMUSCULAR; INTRAVENOUS AS NEEDED
Status: DISCONTINUED | OUTPATIENT
Start: 2022-05-13 | End: 2022-05-13 | Stop reason: HOSPADM

## 2022-05-13 RX ORDER — FENTANYL CITRATE 50 UG/ML
25 INJECTION, SOLUTION INTRAMUSCULAR; INTRAVENOUS AS NEEDED
Status: DISCONTINUED | OUTPATIENT
Start: 2022-05-13 | End: 2022-05-13 | Stop reason: HOSPADM

## 2022-05-13 RX ORDER — FLUMAZENIL 0.1 MG/ML
0.2 INJECTION INTRAVENOUS
Status: DISCONTINUED | OUTPATIENT
Start: 2022-05-13 | End: 2022-05-13 | Stop reason: HOSPADM

## 2022-05-13 RX ADMIN — PROPOFOL 20 MG: 10 INJECTION, EMULSION INTRAVENOUS at 07:38

## 2022-05-13 RX ADMIN — PROPOFOL 40 MG: 10 INJECTION, EMULSION INTRAVENOUS at 07:42

## 2022-05-13 RX ADMIN — PROPOFOL 100 MCG/KG/MIN: 10 INJECTION, EMULSION INTRAVENOUS at 07:35

## 2022-05-13 RX ADMIN — PROPOFOL 20 MG: 10 INJECTION, EMULSION INTRAVENOUS at 07:45

## 2022-05-13 RX ADMIN — PROPOFOL 20 MG: 10 INJECTION, EMULSION INTRAVENOUS at 07:47

## 2022-05-13 RX ADMIN — PROPOFOL 40 MG: 10 INJECTION, EMULSION INTRAVENOUS at 07:35

## 2022-05-13 RX ADMIN — LIDOCAINE HYDROCHLORIDE 40 MG: 20 INJECTION, SOLUTION EPIDURAL; INFILTRATION; INTRACAUDAL; PERINEURAL at 07:35

## 2022-05-13 RX ADMIN — PROPOFOL 30 MG: 10 INJECTION, EMULSION INTRAVENOUS at 07:40

## 2022-05-13 RX ADMIN — PROPOFOL 20 MG: 10 INJECTION, EMULSION INTRAVENOUS at 07:39

## 2022-05-13 RX ADMIN — PROPOFOL 20 MG: 10 INJECTION, EMULSION INTRAVENOUS at 07:37

## 2022-05-13 NOTE — INTERVAL H&P NOTE
Pre-Endoscopy H&P Update  Chief complaint/HPI/ROS:  The indication for the procedure, the patient's history and the patient's current medications are reviewed prior to the procedure and that data is reported on the H&P to which this document is attached. Any significant complaints with regard to organ systems will be noted, and if not mentioned then a review of systems is not contributory. Past Medical History:   Diagnosis Date    Cancer ([facility] [address]     melanoma. Coronary artery disease 2011    Dyslipidemia     Essential hypertension, benign 2011      Past Surgical History:   Procedure Laterality Date    HX CORONARY ARTERY BYPASS GRAFT      HX KNEE REPLACEMENT Bilateral     left and right knee replacement. Social   Social History     Tobacco Use    Smoking status: Former Smoker     Years: 28.00     Quit date: 1970     Years since quittin.3    Smokeless tobacco: Former User   Substance Use Topics    Alcohol use: Yes     Alcohol/week: 14.0 standard drinks     Types: 14 Shots of liquor per week     Comment: 2-3 drinks a night. Family History   Problem Relation Age of Onset    No Known Problems Mother       No Known Allergies   Prior to Admission Medications   Prescriptions Last Dose Informant Patient Reported? Taking? albuterol (PROVENTIL HFA, VENTOLIN HFA, PROAIR HFA) 90 mcg/actuation inhaler 2022 at Unknown time  Yes Yes   Sig: Take 2 Puffs by inhalation as needed. albuterol (PROVENTIL VENTOLIN) 2.5 mg /3 mL (0.083 %) nebulizer solution   Yes No   Sig: by Nebulization route as needed. amLODIPine (NORVASC) 10 mg tablet 2022 at Unknown time  No Yes   Sig: Take 1 Tablet by mouth daily. aspirin 81 mg chewable tablet 2022 at Unknown time  No Yes   Sig: Take 1 Tab by mouth daily. atorvastatin (LIPITOR) 40 mg tablet 2022 at Unknown time  No Yes   Sig: Take 1 Tablet by mouth daily.    hydroCHLOROthiazide (HYDRODIURIL) 25 mg tablet 2022 at Unknown time  No Yes   Sig: TAKE ONE TABLET BY MOUTH DAILY      Facility-Administered Medications: None       PHYSICAL EXAM:  The patient is examined immediately prior to the procedure. Visit Vitals  BP (!) 154/67   Pulse (!) 59   Temp 98.6 °F (37 °C)   Resp 15   Ht 5' 7\" (1.702 m)   Wt 96.6 kg (212 lb 15.4 oz)   SpO2 99%   BMI 33.35 kg/m²     Gen: Appears comfortable, no distress. Pulm: comfortable respirations with no abnormal audible breath sounds  HEART: well perfused, no abnormal audible heart sounds  GI: abdomen flat. PLAN:  Informed consent discussion held, patient afforded an opportunity to ask questions and all questions answered. After being advised of the risks, benefits, and alternatives, the patient requested that we proceed and indicated so on a written consent form. Will proceed with procedure as planned.   Cyndy Starr MD

## 2022-05-13 NOTE — H&P
66 y.o. male for open access colonoscopy for screening   Additional data for completion of the targeted pre-endoscopy H&P will be provided under 'H&P interval notes'. Please see that document which will be attached to this.   Naye Norwood MD  Last colon Everly adenomas three, [phone]

## 2022-05-13 NOTE — PROGRESS NOTES
Ashlie Vo  1950  [id number]    Situation:  Verbal report received from: Juwan Stacy RN   Procedure: Procedure(s):  COLONOSCOPY  ENDOSCOPIC POLYPECTOMY    Background:    Preoperative diagnosis: Personal history of colonic polyps [Z86.010]  Postoperative diagnosis: Colon Polyps  Hemorrhoids    :  Dr. Kulwinder Niño  Assistant(s): Endoscopy Technician-1: Kyrie Santos  Endoscopy RN-1: Crispin Gallo RN    Specimens:   ID Type Source Tests Collected by Time Destination   1 : Ascending Colon Polyps Preservative Colon, Ascending  Robin Mcgraw MD 5/13/2022 0745 Pathology   2 : Sigmoid Colon Polyp Preservative Sigmoid  Robin Mcgraw MD 5/13/2022 0750 Pathology     H. Pylori  no    Assessment:    Anesthesia gave intra-procedure sedation and medications, see anesthesia flow sheet no    Intravenous fluids: NS@ KVO     Vital signs stable     Abdominal assessment: round and soft     Recommendation:  Discharge patient per MD order.   Return to floor  Family or Friend   Permission to share finding with family or friend yes

## 2022-05-13 NOTE — PROGRESS NOTES
Endoscopy discharge instructions have been reviewed and given to patient. The patient verbalized understanding and acceptance of instructions. Dr. Dayana Arellano discussed with patient procedure findings and next steps.

## 2022-05-13 NOTE — PERIOP NOTES
Received recovery report from Anesthesia team, see anesthesia note. ABD remains soft and non-tender post procedure. Pt has no complaints at this time and tolerated the procedure well. Endoscope was pre-cleaned at bedside immediately following procedure by Abby Carrillo. Post recovery report given to Geovanna Champagne RN.

## 2022-05-13 NOTE — ANESTHESIA POSTPROCEDURE EVALUATION
Procedure(s):  COLONOSCOPY  ENDOSCOPIC POLYPECTOMY. MAC    Anesthesia Post Evaluation        Patient location during evaluation: bedside  Level of consciousness: awake  Pain management: satisfactory to patient  Airway patency: patent  Anesthetic complications: no  Cardiovascular status: acceptable  Respiratory status: acceptable  Hydration status: acceptable        INITIAL Post-op Vital signs:   Vitals Value Taken Time   /55 05/13/22 0800   Temp 36.6 °C (97.8 °F) 05/13/22 0755   Pulse 54 05/13/22 0802   Resp 15 05/13/22 0802   SpO2 95 % 05/13/22 0802   Vitals shown include unvalidated device data.

## 2022-05-13 NOTE — PROCEDURES
[address] [facility] DAVID Pritchard MD  [phone]      May 13, 2022    Colonoscopy Procedure Note  Víctor Schmitz  :    [facility] Medical Record Number: [id number]    Indications:     Personal history of colon polyps (screening only)  PCP:  Imani Zhang MD  Anesthesia/Sedation: Conscious Sedation/Moderate Sedation/GETA, see notes  Endoscopist:  Dr. Bonifacio Villanueva  Complications:  None  Estimated Blood Loss:  None    Permit:  The indications, risks, benefits and alternatives were reviewed with the patient or their decision maker who was provided an opportunity to ask questions and all questions were answered. The specific risks of colonoscopy with conscious sedation were reviewed, including but not limited to anesthetic complication, bleeding, adverse drug reaction, missed lesion, infection, IV site reactions, and intestinal perforation which would lead to the need for surgical repair. Alternatives to colonoscopy including radiographic imaging, observation without testing, or laboratory testing were reviewed including the limitations of those alternatives. After considering the options and having all their questions answered, the patient or their decision maker provided both verbal and written consent to proceed. Procedure in Detail:  After obtaining informed consent, positioning of the patient in the left lateral decubitus position, and conduction of a pre-procedure pause or \"time out\" the endoscope was introduced into the anus and advanced to the cecum, which was identified by the ileocecal valve and appendiceal orifice. The quality of the colonic preparation was good. A careful inspection was made as the colonoscope was withdrawn, findings and interventions are described below. Findings: In the rectum, medium internal hemorrhoids are noted without bleeding.   Three polyps, ascending 3mm (cold forceps), ascending 6mm (cold snare), sigmoid 5mm (cold snare). All samples retrieved and hemostasis confirmed. Specimens:    See above    Complications:   None; patient tolerated the procedure well. Impression: Three polyps, hemorrhoids. Recommendations:     - Await pathology. Thank you for entrusting me with this patient's care. Please do not hesitate to contact me with any questions or if I can be of assistance with any of your other patients' GI needs. Signed By: Concepcion Gibbs MD                        May 13, 2022      Surgical assistant none. Implants none unless specified.

## 2022-05-13 NOTE — DISCHARGE INSTRUCTIONS
[address] [facility] DAVID Kam MD  [phone]      May 13, 2022    Sirisha Cox  YOB: 1950    COLONOSCOPY DISCHARGE INSTRUCTIONS    If there is redness at IV site you should apply warm compress to area. If redness or soreness persist contact Dr. Jimmy Kam' or your primary care doctor. There may be a slight amount of blood passed from the rectum. Gaseous discomfort may develop, but walking, belching will help relieve this. You may not operate a vehicle for 12 hours  You may not operate machinery or dangerous appliances for rest of today  You may not drink alcoholic beverages for 12 hours  Avoid making any critical decisions for 24 hours    DIET:  You may resume your normal diet, but some patients find that heavy or large meals may lead to indigestion or vomiting. I suggest a light meal as first food intake. MEDICATIONS:  The use of some over-the-counter pain medication may lead to bleeding after colon biopsies or polyp removal.  Tylenol (also called acetaminophen) is safe to take even if you have had colonoscopy with polyp removal.  Based on the procedure you had today you may not safely take aspirin or aspirin-like products for the next ten (10) days. Remember that Tylenol (also called acetaminophen) is safe to take after colonoscopy even if you have had biopsies or polyps removed. ACTIVITY:  You may resume your normal household activities, but it is recommended that you spend the remainder of the day resting -  avoid any strenuous activity. CALL DR. Jewels Blackmon' OFFICE IF:  Increasing pain, nausea, vomiting  Abdominal distension (swelling)  Significant new or increased bleeding (oral or rectal)  Fever/Chills  Chest pain/shortness of breath                       Additional instructions:   No aspirin 10 days.   We found and removed three polyps today; I'll contact you with those results by letter in 10-14 days. It was an honor to be your doctor today. Please let me or my office staff know if you have any feedback about today's procedure. Ponce Greco MD    Colonoscopy saves lives, and can prevent colon cancer. Everyone aged 48 or older needs colonoscopy.   Tell your family and friends: get the test!

## 2022-05-13 NOTE — ANESTHESIA PREPROCEDURE EVALUATION
Relevant Problems   CARDIOVASCULAR   (+) Coronary artery disease   (+) Essential hypertension, benign       Anesthetic History   No history of anesthetic complications            Review of Systems / Medical History  Patient summary reviewed, nursing notes reviewed and pertinent labs reviewed    Pulmonary  Within defined limits                 Neuro/Psych   Within defined limits           Cardiovascular    Hypertension          CAD (stable)         GI/Hepatic/Renal  Within defined limits              Endo/Other  Within defined limits           Other Findings              Physical Exam    Airway  Mallampati: II  TM Distance: 4 - 6 cm  Neck ROM: normal range of motion   Mouth opening: Normal     Cardiovascular    Rhythm: regular  Rate: normal         Dental  No notable dental hx       Pulmonary  Breath sounds clear to auscultation               Abdominal         Other Findings            Anesthetic Plan    ASA: 2  Anesthesia type: MAC            Anesthetic plan and risks discussed with: Patient

## 2022-12-27 RX ORDER — HYDROCHLOROTHIAZIDE 25 MG/1
TABLET ORAL
Qty: 90 TABLET | Refills: 0 | Status: SHIPPED | OUTPATIENT
Start: 2022-12-27

## 2023-01-05 ENCOUNTER — OFFICE VISIT (OUTPATIENT)
Dept: CARDIOLOGY CLINIC | Age: 73
End: 2023-01-05
Payer: MEDICARE

## 2023-01-05 VITALS
HEART RATE: 60 BPM | HEIGHT: 67 IN | WEIGHT: 214 LBS | BODY MASS INDEX: 33.59 KG/M2 | DIASTOLIC BLOOD PRESSURE: 72 MMHG | OXYGEN SATURATION: 96 % | SYSTOLIC BLOOD PRESSURE: 144 MMHG | RESPIRATION RATE: 16 BRPM

## 2023-01-05 DIAGNOSIS — I10 ESSENTIAL HYPERTENSION, BENIGN: ICD-10-CM

## 2023-01-05 DIAGNOSIS — I25.10 CORONARY ARTERY DISEASE INVOLVING NATIVE CORONARY ARTERY OF NATIVE HEART WITHOUT ANGINA PECTORIS: Primary | ICD-10-CM

## 2023-01-05 PROCEDURE — 93005 ELECTROCARDIOGRAM TRACING: CPT | Performed by: STUDENT IN AN ORGANIZED HEALTH CARE EDUCATION/TRAINING PROGRAM

## 2023-01-05 PROCEDURE — G0463 HOSPITAL OUTPT CLINIC VISIT: HCPCS | Performed by: STUDENT IN AN ORGANIZED HEALTH CARE EDUCATION/TRAINING PROGRAM

## 2023-01-05 NOTE — PROGRESS NOTES
Room B 8    Chief Complaint   Patient presents with    Coronary Artery Disease    Hypertension     Vitals:    01/05/23 1302 01/05/23 1318   BP: (!) 152/70 (!) 144/72  Comment: BP rechecked   BP 1 Location: Left upper arm Left upper arm   BP Patient Position: Sitting Sitting   BP Cuff Size: Adult Adult   Pulse: 60    Resp: 16    Height: 5' 7\" (1.702 m)    Weight: 214 lb (97.1 kg)    SpO2: 96%          Chest pain: no    Shortness of breath: no    Edema: right lower leg  since bypass done    Palpitations, Skipped beats, Rapid heartbeat: no    Dizziness: no    Fatigue:no    New diagnosis/Surgeries: no    1. Have you been to the ER, urgent care clinic since your last visit? Hospitalized since your last visit? no     2. Have you seen or consulted any other health care providers outside of the [address] [place] [address] since your last visit? Include any pap smears or colon screening.  No     Refills: no

## 2023-01-05 NOTE — PROGRESS NOTES
[facility] [address] [facility], [address] [place] [address]    Office [phone],[facility] [phone]           Glenys Peace is a 74 y.o. male presents for f/up visit    Assessment/Recommendations:      Coronary artery disease status post CABG in 1998. LIMA-LAD, SVG-Diag, SVG-OM. - aspirin dose to 81 mg daily  - continue goal-directed medical therapy including statin therapy. - lipids from Magi Boyer MD    Hyperlipidemia- previously elevated, increased atorvastatin 40 mg, January 2018. Repeat lipids 6/22, total chol 164, hdl 94, ldl 62  -lipids followed by Magi Boyer MD    Hypertension  - cont amlodipine to 10mg daily  - cont hctz      Primary Care Physician- Magi Boyer MD    Follow-up one year      Subjective:  74 y.o. male with a history of coronary artery disease presents to the office for follow-up visit. Clinically doing very well from coronary artery disease standpoint. No ongoing chest pain or chest pressure symptoms. Remains active. No adverse bleeding with aspirin    Past Medical History:   Diagnosis Date    Cancer ([facility] [address]     melanoma. Coronary artery disease 1/24/2011    Dyslipidemia     Essential hypertension, benign 1/24/2011        Past Surgical History:   Procedure Laterality Date    COLONOSCOPY N/A 5/13/2022    COLONOSCOPY performed by Erik Madrigal MD at [address]    HX KNEE REPLACEMENT Bilateral     left and right knee replacement. Current Outpatient Medications:     hydroCHLOROthiazide (HYDRODIURIL) 25 mg tablet, TAKE ONE TABLET BY MOUTH DAILY, Disp: 90 Tablet, Rfl: 0    amLODIPine (NORVASC) 10 mg tablet, Take 1 Tablet by mouth daily. , Disp: 90 Tablet, Rfl: 3    atorvastatin (LIPITOR) 40 mg tablet, Take 1 Tablet by mouth daily. , Disp: 90 Tablet, Rfl: 3    albuterol (PROVENTIL HFA, VENTOLIN HFA, PROAIR HFA) 90 mcg/actuation inhaler, Take 2 Puffs by inhalation as needed. , Disp: , Rfl:     aspirin 81 mg chewable tablet, Take 1 Tab by mouth daily. , Disp: 30 Tab, Rfl: 3    albuterol (PROVENTIL VENTOLIN) 2.5 mg /3 mL (0.083 %) nebulizer solution, by Nebulization route as needed. , Disp: , Rfl:     No Known Allergies     Family History   Problem Relation Age of Onset    No Known Problems Mother        Social History     Tobacco Use    Smoking status: Former     Years: 28.00     Types: Cigarettes     Quit date: 1970     Years since quittin.9    Smokeless tobacco: Former   Substance Use Topics    Alcohol use: Yes     Alcohol/week: 14.0 standard drinks     Types: 14 Shots of liquor per week     Comment: 2-3 drinks a night. Drug use: Never       Review of Symptoms:  Pertinent Positive: Negative  Pertinent Negative: No chest pain shortness of breath orthopnea PND palpitations  All Other systems reviewed and are negative for a Comprehensive ROS (10+)    Physical Exam    Blood pressure (!) 152/70, resp. rate 16, height 5' 7\" (1.702 m), weight 214 lb (97.1 kg), SpO2 96 %. Constitutional:  well-developed and well-nourished. No distress. HENT: Normocephalic. Eyes: No scleral icterus. Neck:  Neck supple. No JVD present. Pulmonary/Chest: Effort normal and breath sounds normal. No respiratory distress, wheezes or rales. Cardiovascular: Normal rate, regular rhythm, S1 S2 . Exam reveals no gallop and no friction rub. No murmur heard. No edema. Extremities:  Normal muscle tone, right LE swelling through groin  Abdominal:   No abnormal distension. Neurological:  Moving all extremities, cranial nerves appear grossly intact. Skin: Skin is not cold. Not diaphoretic. No erythema. Psychiatric:  Grossly normal mood and affect. Intact insight. Objective Data:       CABG ([facility]): LIMA-LAD, SVG-Diag, SVG-OM. Echo (): EF 55%. Echo (12):  EF 50-55%, pseudo. Lexiscan Cardiolite (2012): Fixed inferolat defect.   No reversible defects. EF 57%. Exercise Cardiolite (1/19/17): Large fixed inferolateral defect. No reversible defects. EF 53%. ECG 12/19/2018-sinus rhythm frequent PVCs     Event monitor 12/20/2018-rare isolated supraventricular beats, rare supraventricular pairs rare supraventricular runs. Isolated ventricular beats. 2% PVCs.    03/11/20   ECHO ADULT COMPLETE 03/11/2020 3/11/2020    Narrative · Normal cavity size and systolic function (ejection fraction normal). Increased wall thickness. Calculated left ventricular ejection fraction is   55%. Biplane method used to measure ejection fraction. Moderate (grade 2)   left ventricular diastolic dysfunction. · Mildly dilated right ventricle. · Aortic valve leaflet calcification present w/o stenosis. Trace aortic   valve regurgitation. · Mitral valve thickening. Mild mitral valve regurgitation is present. · Mildly dilated left atrium. · Mild pulmonic valve regurgitation is present. · Pulmonary arterial systolic pressure is 07.3 mmHg.         Signed by: DO Liz Ya DO

## 2023-02-06 RX ORDER — ATORVASTATIN CALCIUM 40 MG/1
TABLET, FILM COATED ORAL
Qty: 90 TABLET | Refills: 3 | Status: SHIPPED | OUTPATIENT
Start: 2023-02-06

## 2023-03-07 RX ORDER — AMLODIPINE BESYLATE 10 MG/1
TABLET ORAL
Qty: 90 TABLET | Refills: 3 | Status: SHIPPED | OUTPATIENT
Start: 2023-03-07

## 2023-03-20 RX ORDER — HYDROCHLOROTHIAZIDE 25 MG/1
TABLET ORAL
Qty: 90 TABLET | Refills: 3 | Status: SHIPPED | OUTPATIENT
Start: 2023-03-20

## 2024-01-11 ENCOUNTER — OFFICE VISIT (OUTPATIENT)
Age: 74
End: 2024-01-11
Payer: MEDICARE

## 2024-01-11 VITALS
WEIGHT: 210 LBS | OXYGEN SATURATION: 98 % | HEIGHT: 67 IN | HEART RATE: 60 BPM | BODY MASS INDEX: 32.96 KG/M2 | SYSTOLIC BLOOD PRESSURE: 160 MMHG | DIASTOLIC BLOOD PRESSURE: 70 MMHG

## 2024-01-11 DIAGNOSIS — E78.5 HYPERLIPIDEMIA, UNSPECIFIED HYPERLIPIDEMIA TYPE: ICD-10-CM

## 2024-01-11 DIAGNOSIS — I10 ESSENTIAL (PRIMARY) HYPERTENSION: ICD-10-CM

## 2024-01-11 DIAGNOSIS — I25.118 CORONARY ARTERY DISEASE OF NATIVE ARTERY OF NATIVE HEART WITH STABLE ANGINA PECTORIS (HCC): Primary | ICD-10-CM

## 2024-01-11 DIAGNOSIS — M79.89 LEG SWELLING: ICD-10-CM

## 2024-01-11 PROCEDURE — 3017F COLORECTAL CA SCREEN DOC REV: CPT | Performed by: STUDENT IN AN ORGANIZED HEALTH CARE EDUCATION/TRAINING PROGRAM

## 2024-01-11 PROCEDURE — 99214 OFFICE O/P EST MOD 30 MIN: CPT | Performed by: STUDENT IN AN ORGANIZED HEALTH CARE EDUCATION/TRAINING PROGRAM

## 2024-01-11 PROCEDURE — G8428 CUR MEDS NOT DOCUMENT: HCPCS | Performed by: STUDENT IN AN ORGANIZED HEALTH CARE EDUCATION/TRAINING PROGRAM

## 2024-01-11 PROCEDURE — 93010 ELECTROCARDIOGRAM REPORT: CPT | Performed by: STUDENT IN AN ORGANIZED HEALTH CARE EDUCATION/TRAINING PROGRAM

## 2024-01-11 PROCEDURE — 3077F SYST BP >= 140 MM HG: CPT | Performed by: STUDENT IN AN ORGANIZED HEALTH CARE EDUCATION/TRAINING PROGRAM

## 2024-01-11 PROCEDURE — 93005 ELECTROCARDIOGRAM TRACING: CPT | Performed by: STUDENT IN AN ORGANIZED HEALTH CARE EDUCATION/TRAINING PROGRAM

## 2024-01-11 PROCEDURE — 3078F DIAST BP <80 MM HG: CPT | Performed by: STUDENT IN AN ORGANIZED HEALTH CARE EDUCATION/TRAINING PROGRAM

## 2024-01-11 PROCEDURE — G8484 FLU IMMUNIZE NO ADMIN: HCPCS | Performed by: STUDENT IN AN ORGANIZED HEALTH CARE EDUCATION/TRAINING PROGRAM

## 2024-01-11 PROCEDURE — 1036F TOBACCO NON-USER: CPT | Performed by: STUDENT IN AN ORGANIZED HEALTH CARE EDUCATION/TRAINING PROGRAM

## 2024-01-11 PROCEDURE — 1123F ACP DISCUSS/DSCN MKR DOCD: CPT | Performed by: STUDENT IN AN ORGANIZED HEALTH CARE EDUCATION/TRAINING PROGRAM

## 2024-01-11 PROCEDURE — G8417 CALC BMI ABV UP PARAM F/U: HCPCS | Performed by: STUDENT IN AN ORGANIZED HEALTH CARE EDUCATION/TRAINING PROGRAM

## 2024-01-11 RX ORDER — ATORVASTATIN CALCIUM 80 MG/1
80 TABLET, FILM COATED ORAL DAILY
Qty: 90 TABLET | Refills: 3 | Status: SHIPPED | OUTPATIENT
Start: 2024-01-11

## 2024-01-11 RX ORDER — METOPROLOL SUCCINATE 25 MG/1
25 TABLET, EXTENDED RELEASE ORAL DAILY
Qty: 90 TABLET | Refills: 3 | Status: SHIPPED | OUTPATIENT
Start: 2024-01-11

## 2024-01-11 NOTE — PROGRESS NOTES
Chief Complaint   Patient presents with    Coronary Artery Disease    Hypertension     Vitals:    01/11/24 1245   BP: (!) 160/70   Site: Left Upper Arm   Position: Sitting   Cuff Size: Medium Adult   Pulse: 60   SpO2: 98%   Weight: 95.3 kg (210 lb)   Height: 1.702 m (5' 7\")      BP (!) 160/70 (Site: Left Upper Arm, Position: Sitting, Cuff Size: Medium Adult)   Pulse 60   Ht 1.702 m (5' 7\")   Wt 95.3 kg (210 lb)   SpO2 98%   BMI 32.89 kg/m²

## 2024-01-16 ENCOUNTER — ANCILLARY PROCEDURE (OUTPATIENT)
Age: 74
End: 2024-01-16
Payer: MEDICARE

## 2024-01-16 DIAGNOSIS — M79.89 LEG SWELLING: ICD-10-CM

## 2024-01-16 LAB
VAS LEFT GSV JUNC RFX: 2.8 S
VAS RIGHT GSV JUNC RFX: 1.4 S

## 2024-01-16 PROCEDURE — 93970 EXTREMITY STUDY: CPT | Performed by: SPECIALIST

## 2024-01-31 ENCOUNTER — ANCILLARY PROCEDURE (OUTPATIENT)
Age: 74
End: 2024-01-31
Payer: MEDICARE

## 2024-01-31 VITALS — BODY MASS INDEX: 32.96 KG/M2 | WEIGHT: 210 LBS | HEIGHT: 67 IN

## 2024-01-31 VITALS
SYSTOLIC BLOOD PRESSURE: 160 MMHG | DIASTOLIC BLOOD PRESSURE: 70 MMHG | HEIGHT: 67 IN | WEIGHT: 210 LBS | BODY MASS INDEX: 32.96 KG/M2

## 2024-01-31 DIAGNOSIS — I25.118 CORONARY ARTERY DISEASE OF NATIVE ARTERY OF NATIVE HEART WITH STABLE ANGINA PECTORIS (HCC): ICD-10-CM

## 2024-01-31 LAB
ECHO AO ASC DIAM: 3.2 CM
ECHO AO ASCENDING AORTA INDEX: 1.55 CM/M2
ECHO AO ROOT DIAM: 3.2 CM
ECHO AO ROOT INDEX: 1.55 CM/M2
ECHO AV AREA PEAK VELOCITY: 1.9 CM2
ECHO AV AREA VTI: 2.1 CM2
ECHO AV AREA/BSA PEAK VELOCITY: 0.9 CM2/M2
ECHO AV AREA/BSA VTI: 1 CM2/M2
ECHO AV MEAN GRADIENT: 12 MMHG
ECHO AV MEAN VELOCITY: 1.6 M/S
ECHO AV PEAK GRADIENT: 22 MMHG
ECHO AV PEAK VELOCITY: 2.3 M/S
ECHO AV VELOCITY RATIO: 0.52
ECHO AV VTI: 57.9 CM
ECHO BSA: 2.12 M2
ECHO BSA: 2.12 M2
ECHO EST RA PRESSURE: 3 MMHG
ECHO LA DIAMETER INDEX: 2.38 CM/M2
ECHO LA DIAMETER: 4.9 CM
ECHO LA TO AORTIC ROOT RATIO: 1.53
ECHO LA VOL A-L A2C: 73 ML (ref 18–58)
ECHO LA VOL A-L A4C: 81 ML (ref 18–58)
ECHO LA VOL BP: 77 ML (ref 18–58)
ECHO LA VOL MOD A2C: 70 ML (ref 18–58)
ECHO LA VOL MOD A4C: 79 ML (ref 18–58)
ECHO LA VOL/BSA BIPLANE: 37 ML/M2 (ref 16–34)
ECHO LA VOLUME AREA LENGTH: 80 ML
ECHO LA VOLUME INDEX A-L A2C: 35 ML/M2 (ref 16–34)
ECHO LA VOLUME INDEX A-L A4C: 39 ML/M2 (ref 16–34)
ECHO LA VOLUME INDEX AREA LENGTH: 39 ML/M2 (ref 16–34)
ECHO LA VOLUME INDEX MOD A2C: 34 ML/M2 (ref 16–34)
ECHO LA VOLUME INDEX MOD A4C: 38 ML/M2 (ref 16–34)
ECHO LV E' LATERAL VELOCITY: 4 CM/S
ECHO LV E' SEPTAL VELOCITY: 5 CM/S
ECHO LV EDV A2C: 95 ML
ECHO LV EDV A4C: 122 ML
ECHO LV EDV BP: 110 ML (ref 67–155)
ECHO LV EDV INDEX A4C: 59 ML/M2
ECHO LV EDV INDEX BP: 53 ML/M2
ECHO LV EDV NDEX A2C: 46 ML/M2
ECHO LV EJECTION FRACTION A2C: 51 %
ECHO LV EJECTION FRACTION A4C: 38 %
ECHO LV EJECTION FRACTION BIPLANE: 44 % (ref 55–100)
ECHO LV ESV A2C: 46 ML
ECHO LV ESV A4C: 75 ML
ECHO LV ESV BP: 61 ML (ref 22–58)
ECHO LV ESV INDEX A2C: 22 ML/M2
ECHO LV ESV INDEX A4C: 36 ML/M2
ECHO LV ESV INDEX BP: 30 ML/M2
ECHO LV FRACTIONAL SHORTENING: 18 % (ref 28–44)
ECHO LV INTERNAL DIMENSION DIASTOLE INDEX: 2.96 CM/M2
ECHO LV INTERNAL DIMENSION DIASTOLIC: 6.1 CM (ref 4.2–5.9)
ECHO LV INTERNAL DIMENSION SYSTOLIC INDEX: 2.43 CM/M2
ECHO LV INTERNAL DIMENSION SYSTOLIC: 5 CM
ECHO LV IVSD: 1.3 CM (ref 0.6–1)
ECHO LV MASS 2D: 304.9 G (ref 88–224)
ECHO LV MASS INDEX 2D: 148 G/M2 (ref 49–115)
ECHO LV POSTERIOR WALL DIASTOLIC: 1 CM (ref 0.6–1)
ECHO LV RELATIVE WALL THICKNESS RATIO: 0.33
ECHO LVOT AREA: 3.8 CM2
ECHO LVOT AV VTI INDEX: 0.55
ECHO LVOT DIAM: 2.2 CM
ECHO LVOT MEAN GRADIENT: 3 MMHG
ECHO LVOT PEAK GRADIENT: 6 MMHG
ECHO LVOT PEAK VELOCITY: 1.2 M/S
ECHO LVOT STROKE VOLUME INDEX: 58.5 ML/M2
ECHO LVOT SV: 120.4 ML
ECHO LVOT VTI: 31.7 CM
ECHO MV A VELOCITY: 0.69 M/S
ECHO MV AREA PHT: 3.5 CM2
ECHO MV E DECELERATION TIME (DT): 215.2 MS
ECHO MV E VELOCITY: 1.14 M/S
ECHO MV E/A RATIO: 1.65
ECHO MV E/E' LATERAL: 28.5
ECHO MV E/E' RATIO (AVERAGED): 25.65
ECHO MV PRESSURE HALF TIME (PHT): 62.4 MS
ECHO RA AREA 4C: 28 CM2
ECHO RA END SYSTOLIC VOLUME APICAL 4 CHAMBER INDEX BSA: 48 ML/M2
ECHO RA VOLUME AREA LENGTH APICAL 4 CHAMBER: 102 ML
ECHO RA VOLUME: 99 ML
ECHO RIGHT VENTRICULAR SYSTOLIC PRESSURE (RVSP): 31 MMHG
ECHO RV FREE WALL PEAK S': 11 CM/S
ECHO RV INTERNAL DIMENSION: 4.5 CM
ECHO RV TAPSE: 2.1 CM (ref 1.7–?)
ECHO TV REGURGITANT MAX VELOCITY: 2.64 M/S
ECHO TV REGURGITANT PEAK GRADIENT: 28 MMHG
NUC STRESS EJECTION FRACTION: 34 %
STRESS ANGINA INDEX: 0
STRESS BASELINE DIAS BP: 66 MMHG
STRESS BASELINE HR: 63 BPM
STRESS BASELINE ST DEPRESSION: 0 MM
STRESS BASELINE SYS BP: 138 MMHG
STRESS ESTIMATED WORKLOAD: 12.5 METS
STRESS EXERCISE DUR MIN: 7 MIN
STRESS EXERCISE DUR SEC: 48 SEC
STRESS O2 SAT PEAK: 98 %
STRESS O2 SAT REST: 98 %
STRESS PEAK DIAS BP: 68 MMHG
STRESS PEAK SYS BP: 158 MMHG
STRESS PERCENT HR ACHIEVED: 81 %
STRESS POST PEAK HR: 117 BPM
STRESS RATE PRESSURE PRODUCT: NORMAL BPM*MMHG
STRESS TARGET HR: 145 BPM
TID: 1.12

## 2024-01-31 PROCEDURE — 93306 TTE W/DOPPLER COMPLETE: CPT | Performed by: STUDENT IN AN ORGANIZED HEALTH CARE EDUCATION/TRAINING PROGRAM

## 2024-01-31 PROCEDURE — 78452 HT MUSCLE IMAGE SPECT MULT: CPT | Performed by: INTERNAL MEDICINE

## 2024-01-31 PROCEDURE — PBSHW PBB SHADOW CHARGE: Performed by: STUDENT IN AN ORGANIZED HEALTH CARE EDUCATION/TRAINING PROGRAM

## 2024-01-31 PROCEDURE — A9500 TC99M SESTAMIBI: HCPCS | Performed by: STUDENT IN AN ORGANIZED HEALTH CARE EDUCATION/TRAINING PROGRAM

## 2024-01-31 PROCEDURE — 93016 CV STRESS TEST SUPVJ ONLY: CPT | Performed by: INTERNAL MEDICINE

## 2024-01-31 PROCEDURE — 93018 CV STRESS TEST I&R ONLY: CPT | Performed by: INTERNAL MEDICINE

## 2024-01-31 RX ORDER — ATORVASTATIN CALCIUM 40 MG/1
40 TABLET, FILM COATED ORAL DAILY
Qty: 90 TABLET | OUTPATIENT
Start: 2024-01-31

## 2024-01-31 RX ORDER — TETRAKIS(2-METHOXYISOBUTYLISOCYANIDE)COPPER(I) TETRAFLUOROBORATE 1 MG/ML
25 INJECTION, POWDER, LYOPHILIZED, FOR SOLUTION INTRAVENOUS
Status: COMPLETED | OUTPATIENT
Start: 2024-01-31 | End: 2024-01-31

## 2024-01-31 RX ORDER — TETRAKIS(2-METHOXYISOBUTYLISOCYANIDE)COPPER(I) TETRAFLUOROBORATE 1 MG/ML
8.5 INJECTION, POWDER, LYOPHILIZED, FOR SOLUTION INTRAVENOUS
Status: COMPLETED | OUTPATIENT
Start: 2024-01-31 | End: 2024-01-31

## 2024-01-31 RX ADMIN — TECHNETIUM TC-99M SESTAMIBI 8.5 MILLICURIE: 1 INJECTION INTRAVENOUS at 08:12

## 2024-01-31 RX ADMIN — TECHNETIUM TC-99M SESTAMIBI 25 MILLICURIE: 1 INJECTION INTRAVENOUS at 09:45

## 2024-01-31 NOTE — TELEPHONE ENCOUNTER
Refill per VO of Dr. TANYA Casey, OD:    Last appt:  1/11/2024    Future Appointments   Date Time Provider Department Center   1/31/2024 12:30 PM [facility] ECHO 3 [facility]       Requested Prescriptions     Pending Prescriptions Disp Refills    atorvastatin (LIPITOR) 40 MG tablet [Pharmacy Med Name: ATORVASTATIN 40 MG TABLET] 90 tablet      Sig: TAKE ONE TABLET BY MOUTH DAILY

## 2024-02-01 ENCOUNTER — DIRECT ADMIT ORDERS (OUTPATIENT)
Age: 74
End: 2024-02-01

## 2024-02-01 ENCOUNTER — TELEPHONE (OUTPATIENT)
Age: 74
End: 2024-02-01

## 2024-02-01 DIAGNOSIS — R94.39 ABNORMAL CARDIOVASCULAR STRESS TEST: Primary | ICD-10-CM

## 2024-02-01 DIAGNOSIS — R94.39 ABNORMAL STRESS TEST: Primary | ICD-10-CM

## 2024-02-01 DIAGNOSIS — Z01.818 PRE-OP TESTING: Primary | ICD-10-CM

## 2024-02-01 RX ORDER — SODIUM CHLORIDE 0.9 % (FLUSH) 0.9 %
5-40 SYRINGE (ML) INJECTION EVERY 12 HOURS SCHEDULED
OUTPATIENT
Start: 2024-02-16

## 2024-02-01 RX ORDER — SODIUM CHLORIDE 0.9 % (FLUSH) 0.9 %
5-40 SYRINGE (ML) INJECTION PRN
OUTPATIENT
Start: 2024-02-16

## 2024-02-01 RX ORDER — SODIUM CHLORIDE 9 MG/ML
INJECTION, SOLUTION INTRAVENOUS PRN
OUTPATIENT
Start: 2024-02-16

## 2024-02-01 NOTE — TELEPHONE ENCOUNTER
Spoke with Pt of [facility] schd. For 2/16/24 At 8:00am arrive at 6:30am Pt aware that they need a  NPO from Midnight the night before. Check in at the second floor Outpt. Reg. Desk. Pt is to have Labs done between 2/1/24-2/9/24.   Medications:  Hold HCTZ day of procedure   VO by /nurse Cristela FULLER

## 2024-02-02 DIAGNOSIS — Z01.818 PRE-OP TESTING: ICD-10-CM

## 2024-02-03 LAB
ANION GAP SERPL CALC-SCNC: 6 MMOL/L (ref 5–15)
BUN SERPL-MCNC: 24 MG/DL (ref 6–20)
BUN/CREAT SERPL: 25 (ref 12–20)
CALCIUM SERPL-MCNC: 9 MG/DL (ref 8.5–10.1)
CHLORIDE SERPL-SCNC: 105 MMOL/L (ref 97–108)
CO2 SERPL-SCNC: 27 MMOL/L (ref 21–32)
CREAT SERPL-MCNC: 0.95 MG/DL (ref 0.7–1.3)
ERYTHROCYTE [DISTWIDTH] IN BLOOD BY AUTOMATED COUNT: 11.9 % (ref 11.5–14.5)
GLUCOSE SERPL-MCNC: 103 MG/DL (ref 65–100)
HCT VFR BLD AUTO: 42.3 % (ref 36.6–50.3)
HGB BLD-MCNC: 13.9 G/DL (ref 12.1–17)
MCH RBC QN AUTO: 31.4 PG (ref 26–34)
MCHC RBC AUTO-ENTMCNC: 32.9 G/DL (ref 30–36.5)
MCV RBC AUTO: 95.7 FL (ref 80–99)
NRBC # BLD: 0 K/UL (ref 0–0.01)
NRBC BLD-RTO: 0 PER 100 WBC
PLATELET # BLD AUTO: 197 K/UL (ref 150–400)
PMV BLD AUTO: 12.3 FL (ref 8.9–12.9)
POTASSIUM SERPL-SCNC: 3.7 MMOL/L (ref 3.5–5.1)
RBC # BLD AUTO: 4.42 M/UL (ref 4.1–5.7)
SODIUM SERPL-SCNC: 138 MMOL/L (ref 136–145)
WBC # BLD AUTO: 9.5 K/UL (ref 4.1–11.1)

## 2024-02-16 ENCOUNTER — TELEPHONE (OUTPATIENT)
Age: 74
End: 2024-02-16

## 2024-02-16 ENCOUNTER — HOSPITAL ENCOUNTER (OUTPATIENT)
Facility: HOSPITAL | Age: 74
Setting detail: OUTPATIENT SURGERY
Discharge: HOME | End: 2024-02-16
Attending: STUDENT IN AN ORGANIZED HEALTH CARE EDUCATION/TRAINING PROGRAM | Admitting: STUDENT IN AN ORGANIZED HEALTH CARE EDUCATION/TRAINING PROGRAM
Payer: MEDICARE

## 2024-02-16 VITALS
OXYGEN SATURATION: 95 % | TEMPERATURE: 97.4 F | RESPIRATION RATE: 12 BRPM | BODY MASS INDEX: 33.1 KG/M2 | WEIGHT: 210.9 LBS | DIASTOLIC BLOOD PRESSURE: 71 MMHG | HEIGHT: 67 IN | SYSTOLIC BLOOD PRESSURE: 123 MMHG | HEART RATE: 53 BPM

## 2024-02-16 DIAGNOSIS — R94.39 ABNORMAL CARDIOVASCULAR STRESS TEST: ICD-10-CM

## 2024-02-16 DIAGNOSIS — I25.119 CORONARY ARTERY DISEASE WITH ANGINA PECTORIS, UNSPECIFIED VESSEL OR LESION TYPE, UNSPECIFIED WHETHER NATIVE OR TRANSPLANTED HEART (HCC): Primary | ICD-10-CM

## 2024-02-16 DIAGNOSIS — R94.39 ABNORMAL STRESS TEST: ICD-10-CM

## 2024-02-16 LAB — ECHO BSA: 2.13 M2

## 2024-02-16 PROCEDURE — 99153 MOD SED SAME PHYS/QHP EA: CPT | Performed by: STUDENT IN AN ORGANIZED HEALTH CARE EDUCATION/TRAINING PROGRAM

## 2024-02-16 PROCEDURE — C1887 CATHETER, GUIDING: HCPCS | Performed by: STUDENT IN AN ORGANIZED HEALTH CARE EDUCATION/TRAINING PROGRAM

## 2024-02-16 PROCEDURE — 6360000004 HC RX CONTRAST MEDICATION: Performed by: STUDENT IN AN ORGANIZED HEALTH CARE EDUCATION/TRAINING PROGRAM

## 2024-02-16 PROCEDURE — C1894 INTRO/SHEATH, NON-LASER: HCPCS | Performed by: STUDENT IN AN ORGANIZED HEALTH CARE EDUCATION/TRAINING PROGRAM

## 2024-02-16 PROCEDURE — 93455 CORONARY ART/GRFT ANGIO S&I: CPT | Performed by: STUDENT IN AN ORGANIZED HEALTH CARE EDUCATION/TRAINING PROGRAM

## 2024-02-16 PROCEDURE — 6360000002 HC RX W HCPCS: Performed by: STUDENT IN AN ORGANIZED HEALTH CARE EDUCATION/TRAINING PROGRAM

## 2024-02-16 PROCEDURE — 99152 MOD SED SAME PHYS/QHP 5/>YRS: CPT | Performed by: STUDENT IN AN ORGANIZED HEALTH CARE EDUCATION/TRAINING PROGRAM

## 2024-02-16 PROCEDURE — C1769 GUIDE WIRE: HCPCS | Performed by: STUDENT IN AN ORGANIZED HEALTH CARE EDUCATION/TRAINING PROGRAM

## 2024-02-16 PROCEDURE — 2709999900 HC NON-CHARGEABLE SUPPLY: Performed by: STUDENT IN AN ORGANIZED HEALTH CARE EDUCATION/TRAINING PROGRAM

## 2024-02-16 PROCEDURE — 2500000003 HC RX 250 WO HCPCS: Performed by: STUDENT IN AN ORGANIZED HEALTH CARE EDUCATION/TRAINING PROGRAM

## 2024-02-16 RX ORDER — LIDOCAINE HYDROCHLORIDE 10 MG/ML
INJECTION, SOLUTION INFILTRATION; PERINEURAL PRN
Status: DISCONTINUED | OUTPATIENT
Start: 2024-02-16 | End: 2024-02-16 | Stop reason: HOSPADM

## 2024-02-16 RX ORDER — SODIUM CHLORIDE 0.9 % (FLUSH) 0.9 %
5-40 SYRINGE (ML) INJECTION EVERY 12 HOURS SCHEDULED
Status: DISCONTINUED | OUTPATIENT
Start: 2024-02-16 | End: 2024-02-16 | Stop reason: HOSPADM

## 2024-02-16 RX ORDER — MIDAZOLAM HYDROCHLORIDE 1 MG/ML
INJECTION INTRAMUSCULAR; INTRAVENOUS PRN
Status: DISCONTINUED | OUTPATIENT
Start: 2024-02-16 | End: 2024-02-16 | Stop reason: HOSPADM

## 2024-02-16 RX ORDER — SODIUM CHLORIDE 0.9 % (FLUSH) 0.9 %
5-40 SYRINGE (ML) INJECTION PRN
Status: DISCONTINUED | OUTPATIENT
Start: 2024-02-16 | End: 2024-02-16 | Stop reason: HOSPADM

## 2024-02-16 RX ORDER — VERAPAMIL HYDROCHLORIDE 2.5 MG/ML
INJECTION, SOLUTION INTRAVENOUS PRN
Status: DISCONTINUED | OUTPATIENT
Start: 2024-02-16 | End: 2024-02-16 | Stop reason: HOSPADM

## 2024-02-16 RX ORDER — HEPARIN SODIUM 200 [USP'U]/100ML
INJECTION, SOLUTION INTRAVENOUS PRN
Status: DISCONTINUED | OUTPATIENT
Start: 2024-02-16 | End: 2024-02-16 | Stop reason: HOSPADM

## 2024-02-16 RX ORDER — FENTANYL CITRATE 50 UG/ML
INJECTION, SOLUTION INTRAMUSCULAR; INTRAVENOUS PRN
Status: DISCONTINUED | OUTPATIENT
Start: 2024-02-16 | End: 2024-02-16 | Stop reason: HOSPADM

## 2024-02-16 RX ORDER — SODIUM CHLORIDE 9 MG/ML
INJECTION, SOLUTION INTRAVENOUS PRN
Status: DISCONTINUED | OUTPATIENT
Start: 2024-02-16 | End: 2024-02-16 | Stop reason: HOSPADM

## 2024-02-16 RX ORDER — HEPARIN SODIUM 1000 [USP'U]/ML
INJECTION, SOLUTION INTRAVENOUS; SUBCUTANEOUS PRN
Status: DISCONTINUED | OUTPATIENT
Start: 2024-02-16 | End: 2024-02-16 | Stop reason: HOSPADM

## 2024-02-16 RX ORDER — ACETAMINOPHEN 325 MG/1
650 TABLET ORAL EVERY 4 HOURS PRN
Status: DISCONTINUED | OUTPATIENT
Start: 2024-02-16 | End: 2024-02-16 | Stop reason: HOSPADM

## 2024-02-16 RX ORDER — BUDESONIDE, GLYCOPYRROLATE, AND FORMOTEROL FUMARATE 160; 9; 4.8 UG/1; UG/1; UG/1
AEROSOL, METERED RESPIRATORY (INHALATION)
COMMUNITY

## 2024-02-16 RX ORDER — CLOPIDOGREL BISULFATE 75 MG/1
75 TABLET ORAL DAILY
Qty: 90 TABLET | Refills: 1 | Status: SHIPPED | OUTPATIENT
Start: 2024-02-16

## 2024-02-16 NOTE — PROGRESS NOTES
7:16 AM  Patient arrived. ID and allergies verified verbally with patient. Pt voices understanding of procedure to be performed. Consent obtained. Pt prepped for procedure.     10:57 AM  TRANSFER - IN REPORT:    Verbal report received from tony Birch  being received from cath lab for routine post-op      Report consisted of patient's Situation, Background, Assessment and   Recommendations(SBAR).     Information from the following report(s) Nurse Handoff Report was reviewed with the receiving nurse.    Opportunity for questions and clarification was provided.      Assessment completed upon patient's arrival to unit and care assumed.     11:55 AM  2 ml air released from TR Band. No bleeding or hematoma noted. Radial and Ulnar pulse on left wrist palpable. Pt tolerated well. Will continue to monitor.    12:00 AM  3 ml air released from TR Band. No bleeding or hematoma noted. Radial and Ulnar pulse on left wrist palpable. Pt tolerated well. Will continue to monitor.      12:05 PM  3 ml air released from TR Band. No bleeding or hematoma noted. Radial and Ulnar pulse on left wrist palpable. Pt tolerated well. Will continue to monitor.    Air release completed. TR Band removed from left wrist. No bleeding or  Hematoma. Dressing applied. Wrist immobilizer in place. Radial and ulnar pulse remain palpable on affected extremity. Pt tolerated well. Instructions given to pt regarding movement and activity restrictions. Pt voiced understanding.    1:15 PM  Discharge instructions and prescriptions reviewed with  patient and family. Opportunity provided for questions. Patient and family verbalized understanding. Signed copy of discharge placed in the front of patient's chart. IV and tele removed.     1:25 PM  Pt sat on side of bed then ambulated around unit and to restroom. Voided. Returned to chair. Left wrist site dressing dry and intact. No bleeding or hematoma. Pt voices no complaints.    1:36 PM  Pt discharged  via wheelchair with wife. Personal belongings with patient upon discharge.

## 2024-02-16 NOTE — H&P (VIEW-ONLY)
Rogelio Casey DO  [facility]  [address], [address]  [place] [address]                                       Office [phone],Fax [phone]    Pre- Cardiac Catheterization Procedure Note    Procedure:  cardiac catheterization  Preprocedure diagnosis:  stable angina  Preprocedure testin24    ECHO (TTE) COMPLETE (PRN CONTRAST/BUBBLE/STRAIN/3D) 2024  4:57 PM (Final)    Interpretation Summary    Left Ventricle: Mildly reduced left ventricular systolic function with a visually estimated EF of 45 - 50%. EF by 2D Simpsons Biplane is 44%. Left ventricle is mildly dilated. Mild septal thickening. Mild global hypokinesis present. Normal diastolic function.    Right Ventricle: Right ventricle is mildly dilated.    Aortic Valve: Mildly thickened cusp. Mildly calcified cusp. Mild stenosis of the aortic valve. AV mean gradient is 12 mmHg. AV peak velocity is 2.3 m/s. AV area by continuity VTI is 2.1 cm2.    Mitral Valve: Mild regurgitation.    Tricuspid Valve: Mild regurgitation.    Left Atrium: Left atrium is mildly dilated. LA Vol Index A/L is 39 mL/m2.    Image quality is good.    Signed by: Rogelio Casey DO on 2024  4:57 PM  24    NM STRESS TEST WITH MYOCARDIAL PERFUSION 2024  8:22 PM (Final)    Interpretation Summary  Abnormal stress myocardial study with small size of moderate intensity perfusion defect of the base to mid inferolateral wall which is not reversible on rest images and is likely an infarct. There is a separate small area of mild perfusion defect in the apical lateral wall which is reversible on rest images and is likely focal small area of ischemia with SDS of 3.  Moderate left ventricular dysfunction with moderate global hypokinesis. LVEF 34%.    No EKG changes of ischemia.    Normal functional capacity.    Signed by: Sukhwinder Weiner MD on 2024  8:22 PM      History of Presenting Illness:  See clinic note  dated 1/11/2024    Review of System:  Constitutional: neg  Resp: neg  Card: chest pain  Neuro: neg      No current facility-administered medications on file prior to encounter.     Current Outpatient Medications on File Prior to Encounter   Medication Sig Dispense Refill    atorvastatin (LIPITOR) 80 MG tablet Take 1 tablet by mouth daily 90 tablet 3    metoprolol succinate (TOPROL XL) 25 MG extended release tablet Take 1 tablet by mouth daily 90 tablet 3    albuterol sulfate HFA (PROVENTIL;VENTOLIN;PROAIR) 108 (90 Base) MCG/ACT inhaler Inhale 2 puffs into the lungs as needed      albuterol (PROVENTIL) (2.5 MG/3ML) 0.083% nebulizer solution Inhale into the lungs as needed      amLODIPine (NORVASC) 10 MG tablet TAKE ONE TABLET BY MOUTH DAILY      aspirin 81 MG chewable tablet Take by mouth daily      hydroCHLOROthiazide (HYDRODIURIL) 25 MG tablet TAKE ONE TABLET BY MOUTH DAILY         No Known Allergies    Physican Exam:    There were no vitals taken for this visit.    Constitutional:  well-developed and well-nourished. No distress.  Pulmonary/Chest: Effort normal and breath sounds normal. No respiratory distress, wheezes or rales.  Cardiovascular: Normal rate, regular rhythm, S1 S2 .   Neurological:  Alert and oriented. Coordination seems grossly normal.   Psychiatric:  Good insight into underlying medical condition.    ASA: 3  Mallampati: 3    Plan:    Risk and benefit of cardiac catheterization/PCI was described in detail to patient.  (risk of vascular access complication with potential surgical intervention for management, stroke, myocardial infarction, emergent open heart surgery and death)    Informed consent was obtained.  Patient wishes to proceed with invasive study with potential percutaneous treatment.

## 2024-02-16 NOTE — H&P
Rogelio Casey DO  [facility]  [address], [address]  [place] [address]                                       Office [phone],Fax [phone]    Pre- Cardiac Catheterization Procedure Note    Procedure:  cardiac catheterization  Preprocedure diagnosis:  stable angina  Preprocedure testin24    ECHO (TTE) COMPLETE (PRN CONTRAST/BUBBLE/STRAIN/3D) 2024  4:57 PM (Final)    Interpretation Summary    Left Ventricle: Mildly reduced left ventricular systolic function with a visually estimated EF of 45 - 50%. EF by 2D Simpsons Biplane is 44%. Left ventricle is mildly dilated. Mild septal thickening. Mild global hypokinesis present. Normal diastolic function.    Right Ventricle: Right ventricle is mildly dilated.    Aortic Valve: Mildly thickened cusp. Mildly calcified cusp. Mild stenosis of the aortic valve. AV mean gradient is 12 mmHg. AV peak velocity is 2.3 m/s. AV area by continuity VTI is 2.1 cm2.    Mitral Valve: Mild regurgitation.    Tricuspid Valve: Mild regurgitation.    Left Atrium: Left atrium is mildly dilated. LA Vol Index A/L is 39 mL/m2.    Image quality is good.    Signed by: Rogelio Casey DO on 2024  4:57 PM  24    NM STRESS TEST WITH MYOCARDIAL PERFUSION 2024  8:22 PM (Final)    Interpretation Summary  Abnormal stress myocardial study with small size of moderate intensity perfusion defect of the base to mid inferolateral wall which is not reversible on rest images and is likely an infarct. There is a separate small area of mild perfusion defect in the apical lateral wall which is reversible on rest images and is likely focal small area of ischemia with SDS of 3.  Moderate left ventricular dysfunction with moderate global hypokinesis. LVEF 34%.    No EKG changes of ischemia.    Normal functional capacity.    Signed by: Sukhwinder Weiner MD on 2024  8:22 PM      History of Presenting Illness:  See clinic note

## 2024-02-16 NOTE — TELEPHONE ENCOUNTER
Spoke with Pt of PCI of LAD/Diagonal w/Poss. Atherectomy  2 time slots schd. For 3/1/24 At 1pm arrive at 11:30am Pt aware that they need a  NPO from Midnight the night before. Check in at the second floor Outpt. Reg. Desk. Pt is to have Labs done on 2/2/24. .  Medications:  Hold HCTZ day of procedure   VO by /nurse Cristela JOYCE

## 2024-02-16 NOTE — PROGRESS NOTES
9:55 AM  TRANSFER - OUT REPORT:    Verbal report given to Kaur on Shayne Birch  being transferred to Cath Lab  for ordered procedure       Report consisted of patient's Situation, Background, Assessment and   Recommendations(SBAR).     Information from the following report(s) Nurse Handoff Report was reviewed with the receiving nurse.           Lines:   Peripheral IV 02/16/24 Left Antecubital (Active)   Site Assessment Clean, dry & intact 02/16/24 0810   Line Status Blood return noted;Flushed 02/16/24 0810   Line Care Cap changed 02/16/24 0810   Phlebitis Assessment No symptoms 02/16/24 0810   Infiltration Assessment 0 02/16/24 0810   Alcohol Cap Used Yes 02/16/24 0810   Dressing Status Clean, dry & intact 02/16/24 0810   Dressing Type Transparent 02/16/24 0810   Dressing Intervention New 02/16/24 0810        Opportunity for questions and clarification was provided.      Patient transported with:  Registered Nurse

## 2024-02-16 NOTE — DISCHARGE INSTRUCTIONS
mind:  No driving for at least 24 hours, or as designated by your physician.  Limit the number of times you go up and down the stairs  Take rests and pace yourself with activity.  Be careful and do not strain with bowel movements.    Diet:  Drink plenty of fluids for the next 24 - 48 hours to help your body flush out the dye. If you have kidney, heart, or liver disease and have to limit fluids, talk with your doctor before you increase the amount of fluids you drink.  Keep eating a heart-healthy, low-fat diet that has lots of fruits, vegetables, and whole grains.

## 2024-02-16 NOTE — PROCEDURES
BRIEF PROCEDURE NOTE    Date of Procedure: 2/16/2024   Preoperative Diagnosis: stable CAD  Postoperative Diagnosis: multivessel CAD    Procedure:coronary angiography w/ bypass angiography, moderate sedation  Interventional Cardiologist: Rogelio Casey DO  Assistant: None  Anesthesia: local + IV moderate sedation   I administered moderate sedation throughout this procedure. An independent trained observer pushed medications at my direction, and monitored the patient’s level of consciousness and physiological status throughout.  Estimated Blood Loss: Minimal    Access: left radial artery, 6F  Catheters:  Left coronary:JL 4, 5f  Right coronary: JR 4, 5f  Lima: im  Svg: jr4, 5F    Findings:   L Main:  appears to be short, large caliber without significant disease  LAD: large caliber, proximal calcified 90% stenosis with sequential 90% stenosis at origin of diagonal, lad distal to diagonal is subtotally occluded, mid and distal lad fill via LIMA  LCx: proximal subtotal occlusion, om appear to be very negatively remodeled and fill via collaterals  RCA: moderate caliber, high bifurcation of PDA, diffuse moderate disease  Lima: widely patent  Both SVG ostially occluded        Specimens Removed: None    Implants: n/a    Closure Device: radial TR band    See full cath note.    Complications: none      Findings:  1. Severe multivessel CAD  2. Severe sequential stenosis of proximal lad into diagonal with subtotally occluded lad distal to diagonal that fills via lima  3. Lcx system appears to have severe negatively remodeling with myocardial perfusion imaging indicating infarction  4. Occluded vein grafts x 2    Plan:    Staged pci of lad into diagonal, likely will required atherectomy    [place]  [address] [facility], [address]  [place] [address]                                              Office [phone],Fax [phone]

## 2024-02-19 ENCOUNTER — DIRECT ADMIT ORDERS (OUTPATIENT)
Age: 74
End: 2024-02-19

## 2024-02-19 ENCOUNTER — TELEPHONE (OUTPATIENT)
Age: 74
End: 2024-02-19

## 2024-02-19 ENCOUNTER — APPOINTMENT (OUTPATIENT)
Facility: HOSPITAL | Age: 74
End: 2024-02-19
Payer: MEDICARE

## 2024-02-19 ENCOUNTER — HOSPITAL ENCOUNTER (OUTPATIENT)
Facility: HOSPITAL | Age: 74
Setting detail: OBSERVATION
Discharge: HOME | End: 2024-02-19
Attending: STUDENT IN AN ORGANIZED HEALTH CARE EDUCATION/TRAINING PROGRAM | Admitting: FAMILY MEDICINE
Payer: MEDICARE

## 2024-02-19 ENCOUNTER — APPOINTMENT (OUTPATIENT)
Facility: HOSPITAL | Age: 74
End: 2024-02-19
Attending: SPECIALIST
Payer: MEDICARE

## 2024-02-19 VITALS
HEIGHT: 67 IN | DIASTOLIC BLOOD PRESSURE: 72 MMHG | BODY MASS INDEX: 32.96 KG/M2 | OXYGEN SATURATION: 95 % | TEMPERATURE: 97.9 F | WEIGHT: 210 LBS | HEART RATE: 66 BPM | RESPIRATION RATE: 16 BRPM | SYSTOLIC BLOOD PRESSURE: 145 MMHG

## 2024-02-19 DIAGNOSIS — R79.89 ELEVATED TROPONIN: ICD-10-CM

## 2024-02-19 DIAGNOSIS — R94.39 ABNORMAL CARDIOVASCULAR STRESS TEST: Primary | ICD-10-CM

## 2024-02-19 DIAGNOSIS — R55 SYNCOPE AND COLLAPSE: Primary | ICD-10-CM

## 2024-02-19 DIAGNOSIS — I25.10 CAD (CORONARY ARTERY DISEASE): ICD-10-CM

## 2024-02-19 DIAGNOSIS — S09.90XA INJURY OF HEAD, INITIAL ENCOUNTER: ICD-10-CM

## 2024-02-19 LAB
ALBUMIN SERPL-MCNC: 3.9 G/DL (ref 3.5–5)
ALBUMIN/GLOB SERPL: 0.9 (ref 1.1–2.2)
ALP SERPL-CCNC: 87 U/L (ref 45–117)
ALT SERPL-CCNC: 60 U/L (ref 12–78)
ANION GAP SERPL CALC-SCNC: 3 MMOL/L (ref 5–15)
AST SERPL-CCNC: 37 U/L (ref 15–37)
BASOPHILS # BLD: 0.1 K/UL (ref 0–0.1)
BASOPHILS NFR BLD: 1 % (ref 0–1)
BILIRUB SERPL-MCNC: 1.6 MG/DL (ref 0.2–1)
BUN SERPL-MCNC: 23 MG/DL (ref 6–20)
BUN/CREAT SERPL: 22 (ref 12–20)
CALCIUM SERPL-MCNC: 9.7 MG/DL (ref 8.5–10.1)
CHLORIDE SERPL-SCNC: 105 MMOL/L (ref 97–108)
CO2 SERPL-SCNC: 30 MMOL/L (ref 21–32)
CREAT SERPL-MCNC: 1.06 MG/DL (ref 0.7–1.3)
DIFFERENTIAL METHOD BLD: ABNORMAL
EKG ATRIAL RATE: 66 BPM
EKG DIAGNOSIS: NORMAL
EKG P AXIS: 58 DEGREES
EKG P-R INTERVAL: 264 MS
EKG Q-T INTERVAL: 436 MS
EKG QRS DURATION: 132 MS
EKG QTC CALCULATION (BAZETT): 457 MS
EKG R AXIS: 73 DEGREES
EKG T AXIS: 59 DEGREES
EKG VENTRICULAR RATE: 66 BPM
EOSINOPHIL # BLD: 0.4 K/UL (ref 0–0.4)
EOSINOPHIL NFR BLD: 5 % (ref 0–7)
ERYTHROCYTE [DISTWIDTH] IN BLOOD BY AUTOMATED COUNT: 11.8 % (ref 11.5–14.5)
GLOBULIN SER CALC-MCNC: 4.3 G/DL (ref 2–4)
GLUCOSE SERPL-MCNC: 112 MG/DL (ref 65–100)
HCT VFR BLD AUTO: 48.2 % (ref 36.6–50.3)
HGB BLD-MCNC: 16 G/DL (ref 12.1–17)
IMM GRANULOCYTES # BLD AUTO: 0 K/UL (ref 0–0.04)
IMM GRANULOCYTES NFR BLD AUTO: 1 % (ref 0–0.5)
LYMPHOCYTES # BLD: 1.8 K/UL (ref 0.8–3.5)
LYMPHOCYTES NFR BLD: 20 % (ref 12–49)
MCH RBC QN AUTO: 31.2 PG (ref 26–34)
MCHC RBC AUTO-ENTMCNC: 33.2 G/DL (ref 30–36.5)
MCV RBC AUTO: 94 FL (ref 80–99)
MONOCYTES # BLD: 0.8 K/UL (ref 0–1)
MONOCYTES NFR BLD: 9 % (ref 5–13)
NEUTS SEG # BLD: 5.7 K/UL (ref 1.8–8)
NEUTS SEG NFR BLD: 64 % (ref 32–75)
NRBC # BLD: 0 K/UL (ref 0–0.01)
NRBC BLD-RTO: 0 PER 100 WBC
PLATELET # BLD AUTO: 210 K/UL (ref 150–400)
PMV BLD AUTO: 11.1 FL (ref 8.9–12.9)
POTASSIUM SERPL-SCNC: 4.6 MMOL/L (ref 3.5–5.1)
PROT SERPL-MCNC: 8.2 G/DL (ref 6.4–8.2)
RBC # BLD AUTO: 5.13 M/UL (ref 4.1–5.7)
SODIUM SERPL-SCNC: 138 MMOL/L (ref 136–145)
TROPONIN I SERPL HS-MCNC: 148 NG/L (ref 0–76)
TROPONIN I SERPL HS-MCNC: 157 NG/L (ref 0–76)
WBC # BLD AUTO: 8.9 K/UL (ref 4.1–11.1)

## 2024-02-19 PROCEDURE — 6370000000 HC RX 637 (ALT 250 FOR IP)

## 2024-02-19 PROCEDURE — 99285 EMERGENCY DEPT VISIT HI MDM: CPT

## 2024-02-19 PROCEDURE — 85025 COMPLETE CBC W/AUTO DIFF WBC: CPT

## 2024-02-19 PROCEDURE — 70450 CT HEAD/BRAIN W/O DYE: CPT

## 2024-02-19 PROCEDURE — 70486 CT MAXILLOFACIAL W/O DYE: CPT

## 2024-02-19 PROCEDURE — 36415 COLL VENOUS BLD VENIPUNCTURE: CPT

## 2024-02-19 PROCEDURE — 72125 CT NECK SPINE W/O DYE: CPT

## 2024-02-19 PROCEDURE — 94761 N-INVAS EAR/PLS OXIMETRY MLT: CPT

## 2024-02-19 PROCEDURE — 80053 COMPREHEN METABOLIC PANEL: CPT

## 2024-02-19 PROCEDURE — 99222 1ST HOSP IP/OBS MODERATE 55: CPT | Performed by: SPECIALIST

## 2024-02-19 PROCEDURE — G0378 HOSPITAL OBSERVATION PER HR: HCPCS

## 2024-02-19 PROCEDURE — 93270 REMOTE 30 DAY ECG REV/REPORT: CPT

## 2024-02-19 PROCEDURE — 93005 ELECTROCARDIOGRAM TRACING: CPT

## 2024-02-19 PROCEDURE — 84484 ASSAY OF TROPONIN QUANT: CPT

## 2024-02-19 PROCEDURE — 93010 ELECTROCARDIOGRAM REPORT: CPT | Performed by: SPECIALIST

## 2024-02-19 RX ORDER — POLYETHYLENE GLYCOL 3350 17 G/17G
17 POWDER, FOR SOLUTION ORAL DAILY PRN
Status: DISCONTINUED | OUTPATIENT
Start: 2024-02-19 | End: 2024-02-19 | Stop reason: HOSPADM

## 2024-02-19 RX ORDER — ACETAMINOPHEN 325 MG/1
650 TABLET ORAL EVERY 6 HOURS PRN
Status: DISCONTINUED | OUTPATIENT
Start: 2024-02-19 | End: 2024-02-19 | Stop reason: HOSPADM

## 2024-02-19 RX ORDER — CLOPIDOGREL BISULFATE 75 MG/1
75 TABLET ORAL DAILY
Status: DISCONTINUED | OUTPATIENT
Start: 2024-02-20 | End: 2024-02-19 | Stop reason: HOSPADM

## 2024-02-19 RX ORDER — SODIUM CHLORIDE 0.9 % (FLUSH) 0.9 %
5-40 SYRINGE (ML) INJECTION EVERY 12 HOURS SCHEDULED
Status: DISCONTINUED | OUTPATIENT
Start: 2024-02-19 | End: 2024-02-19 | Stop reason: HOSPADM

## 2024-02-19 RX ORDER — SODIUM CHLORIDE 0.9 % (FLUSH) 0.9 %
5-40 SYRINGE (ML) INJECTION EVERY 12 HOURS SCHEDULED
OUTPATIENT
Start: 2024-03-01

## 2024-02-19 RX ORDER — SODIUM CHLORIDE 0.9 % (FLUSH) 0.9 %
5-40 SYRINGE (ML) INJECTION PRN
Status: DISCONTINUED | OUTPATIENT
Start: 2024-02-19 | End: 2024-02-19 | Stop reason: HOSPADM

## 2024-02-19 RX ORDER — ONDANSETRON 4 MG/1
4 TABLET, ORALLY DISINTEGRATING ORAL EVERY 8 HOURS PRN
Status: DISCONTINUED | OUTPATIENT
Start: 2024-02-19 | End: 2024-02-19 | Stop reason: HOSPADM

## 2024-02-19 RX ORDER — ATORVASTATIN CALCIUM 20 MG/1
80 TABLET, FILM COATED ORAL DAILY
Status: DISCONTINUED | OUTPATIENT
Start: 2024-02-20 | End: 2024-02-19 | Stop reason: HOSPADM

## 2024-02-19 RX ORDER — METOPROLOL SUCCINATE 25 MG/1
25 TABLET, EXTENDED RELEASE ORAL DAILY
Status: DISCONTINUED | OUTPATIENT
Start: 2024-02-20 | End: 2024-02-19 | Stop reason: HOSPADM

## 2024-02-19 RX ORDER — ENOXAPARIN SODIUM 100 MG/ML
40 INJECTION SUBCUTANEOUS EVERY 24 HOURS
Status: DISCONTINUED | OUTPATIENT
Start: 2024-02-19 | End: 2024-02-19 | Stop reason: HOSPADM

## 2024-02-19 RX ORDER — AMLODIPINE BESYLATE 5 MG/1
10 TABLET ORAL DAILY
Status: DISCONTINUED | OUTPATIENT
Start: 2024-02-19 | End: 2024-02-19

## 2024-02-19 RX ORDER — SODIUM CHLORIDE 9 MG/ML
INJECTION, SOLUTION INTRAVENOUS PRN
Status: DISCONTINUED | OUTPATIENT
Start: 2024-02-19 | End: 2024-02-19 | Stop reason: HOSPADM

## 2024-02-19 RX ORDER — GINSENG 100 MG
CAPSULE ORAL
Status: COMPLETED | OUTPATIENT
Start: 2024-02-19 | End: 2024-02-19

## 2024-02-19 RX ORDER — ONDANSETRON 2 MG/ML
4 INJECTION INTRAMUSCULAR; INTRAVENOUS EVERY 6 HOURS PRN
Status: DISCONTINUED | OUTPATIENT
Start: 2024-02-19 | End: 2024-02-19 | Stop reason: HOSPADM

## 2024-02-19 RX ORDER — ACETAMINOPHEN 650 MG/1
650 SUPPOSITORY RECTAL EVERY 6 HOURS PRN
Status: DISCONTINUED | OUTPATIENT
Start: 2024-02-19 | End: 2024-02-19 | Stop reason: HOSPADM

## 2024-02-19 RX ORDER — AMLODIPINE BESYLATE 5 MG/1
10 TABLET ORAL DAILY
Status: DISCONTINUED | OUTPATIENT
Start: 2024-02-20 | End: 2024-02-19 | Stop reason: HOSPADM

## 2024-02-19 RX ORDER — ASPIRIN 81 MG/1
81 TABLET, CHEWABLE ORAL DAILY
Status: DISCONTINUED | OUTPATIENT
Start: 2024-02-20 | End: 2024-02-19 | Stop reason: HOSPADM

## 2024-02-19 RX ORDER — ALBUTEROL SULFATE 2.5 MG/3ML
2.5 SOLUTION RESPIRATORY (INHALATION) EVERY 4 HOURS PRN
Status: DISCONTINUED | OUTPATIENT
Start: 2024-02-19 | End: 2024-02-19 | Stop reason: HOSPADM

## 2024-02-19 RX ORDER — HYDROCHLOROTHIAZIDE 25 MG/1
25 TABLET ORAL DAILY
Status: DISCONTINUED | OUTPATIENT
Start: 2024-02-20 | End: 2024-02-19 | Stop reason: HOSPADM

## 2024-02-19 RX ORDER — SODIUM CHLORIDE 9 MG/ML
INJECTION, SOLUTION INTRAVENOUS PRN
OUTPATIENT
Start: 2024-03-01

## 2024-02-19 RX ORDER — SODIUM CHLORIDE 0.9 % (FLUSH) 0.9 %
5-40 SYRINGE (ML) INJECTION PRN
OUTPATIENT
Start: 2024-03-01

## 2024-02-19 RX ADMIN — BACITRACIN 1 EACH: 500 OINTMENT TOPICAL at 12:30

## 2024-02-19 ASSESSMENT — PAIN - FUNCTIONAL ASSESSMENT: PAIN_FUNCTIONAL_ASSESSMENT: 0-10

## 2024-02-19 ASSESSMENT — HEART SCORE: ECG: 0

## 2024-02-19 ASSESSMENT — PAIN SCALES - GENERAL
PAINLEVEL_OUTOF10: 0

## 2024-02-19 NOTE — DISCHARGE SUMMARY
Same day admit/discharge. Seen by cardiology and cleared for discharge. They will arrange for outpatient event monitor. Patient agreeable to discharge.

## 2024-02-19 NOTE — ED TRIAGE NOTES
Pt arrives to the ER for complaints of fall. Pt states that he used his inhaler this morning and states that he held his breath, turned around and reports that he fell to the ground. Pt states that he is unsure if he tripped.     Pt denies any LOC.     Pt struck the right side of head.     Reports that he started taking Plavix on 2/17.     Denies any chest pain, shortness of breath, visual changes, gait changes, dizziness, weakness or numbness.

## 2024-02-19 NOTE — ED PROVIDER NOTES
[facility] MULTI-SPECIALTY ONCOLOGY 2  EMERGENCY DEPARTMENT ENCOUNTER      Pt Name: Shayne Birch  MRN: [id number]  Birthdate 1950  Date of evaluation: 2/19/2024  Provider: Cherrie Mcrae PA-C    CHIEF COMPLAINT       Chief Complaint   Patient presents with    Fall         HISTORY OF PRESENT ILLNESS   (Location/Symptom, Timing/Onset, Context/Setting, Quality, Duration, Modifying Factors, Severity)  Note limiting factors.   Shayne Birch is a 73 y.o. male with history of  has a past medical history of Cancer (HCC), Coronary artery disease (1/24/2011), Dyslipidemia, and Essential hypertension, benign (1/24/2011). who presents from home to [facility] ED with cc of fall after syncopal episode. Patient struck the right side of his head and face.  Patient states he was taking his inhaler and took a deep breath in and then passed out.  Denies current lightheadedness.  He denies chest pain, shortness of breath, gait disturbance, vision changes, dizziness, extremity weakness or numbness.  Patient reports recent abnormal stress test.  He sees Dr. Casey for cardiology and states he was going to get a stent placed last Friday but there were calcifications present and the stent was not placed and it is now scheduled for 1 March.      PCP: Milind Rodriguez MD    There are no other complaints, changes or physical findings at this time.                Review of External Medical Records:     Nursing Notes were reviewed.    REVIEW OF SYSTEMS    (2-9 systems for level 4, 10 or more for level 5)     Review of Systems   Neurological:  Positive for syncope.       Except as noted above the remainder of the review of systems was reviewed and negative.       PAST MEDICAL HISTORY     Past Medical History:   Diagnosis Date    Cancer (HCC)     melanoma.     Coronary artery disease 1/24/2011    Dyslipidemia     Essential hypertension, benign 1/24/2011         SURGICAL HISTORY       Past Surgical History:   Procedure

## 2024-02-19 NOTE — TELEPHONE ENCOUNTER
Pt's spouse called,  He fell this morning; unsure if LOC?  Cut eye, hand.  Just started taking Plavix, she hasn't been able to stop the bleeding.  BP currently 157/83.  She has been able to keep him calm.  They are asking about coming to our office vs ED.  Encouraged them to head to ED.    They are on their way to [facility].

## 2024-02-19 NOTE — CARE COORDINATION
Discharge None    Resource Information Provided? No   Mode of Transport at Discharge Other (see comment)  (spouse)     Pt was admitted as OBS on 02/19/24 for syncope and collapse. CM met with Pt and spouse to complete initial assessment. CM introduced self, CM role, and verified demographics. Pt lives at home with spouse. Pt has no hx of HH, home O2, or equipment. Pt is independent with ADLs and ambulation. Pt denies problems with either. Pt denies any past hx of falls. Pt is retired. Pt is able to drive.    Pharmacy: [facility] [place]  COVID vaccination(s): Yes  AMD: Yes- not on file. Pt confirms spouse would be HDM.    Discharge plan is for Pt to return home. Family will transport Pt home.   _____________________________________  CARLOS Biggs  [facility]- Care Management  [phone]  Available via [employer]  2/19/2024   2:21 PM

## 2024-02-19 NOTE — CONSULTS
[facility]                       Cardiology Care Note     [x]Initial Encounter     []Follow-up    Patient Name: Shayne Birch - :1950 - MRN:[id number]  Primary Cardiologist: Rogelio Casey DO  Consulting Cardiologist: Ramy Rose MD     Reason for encounter:  syncope      HPI:       Shayne Birch is a 73 y.o. male with PMH significant for CAD, mild LV dysfunction who p/w syncope.  He was standing taking in a deep breath with his inhaler - he held his breath for better results - and then passed out - may have been dizzy prior - regained consciousness quickly. Otherwise feels fine.  No CP or sig SOB.          Assessment and Plan       1) Syncope   - event happened after he was standing and held his breath with his inhaler   - He feels fine otherwise  - BP stable   - EKG without ischemic changes   - Suspect he likely had a vasovagal event   - will check a 2 week event monitor to rule out arrhythmias  - [place] informed [place] post [place]    - I advised him to sit when holding breath with inhaler     2) Elevated Trop  - mild, flat elevation   - No CP or ischemic EKG changes   - due to underlying CAD which is being addressed as below     3) CAD and mild KV systolic dysfunction   - Staged pci of lad into diagonal planned by Dr. Casey   - cont DAPT, statin, BB    4) HTN  - BP OK -- can adjust meds further outpatient     OK to DC home - I discussed with Dr. Lugo          ____________________________________________________________    Cardiac testing    Telemetry checked - NSR    EKG - NSR, PVC    24    ECHO (TTE) COMPLETE (PRN CONTRAST/BUBBLE/STRAIN/3D) 2024  4:57 PM (Final)    Interpretation Summary    Left Ventricle: Mildly reduced left ventricular systolic function with a visually estimated EF of 45 - 50%. EF by 2D Simpsons Biplane is 44%. Left ventricle is mildly dilated. Mild septal

## 2024-02-19 NOTE — H&P
[facility]  [address] [place], [place] [id number] [phone]    [facility]  Hospitalist [facility]    History & Physical    Date of service: 2/19/2024    Patient name: Shayne Birch  MRN: [id number]  YOB: 1950  Age: 73 y.o.     Primary care provider:  Milind Rodriguez MD     Source of Information: patient, medical records                          Chief complain: Fall    History of present illness  Shayne Birch is a 73 y.o. male who presented with an episode of fall/syncope that occurred this morning.  He was taking a dose of his inhaler and held his breath afterwards.  He thinks he may have felt a little dizzy prior to syncopized but is not sure.  He also was not sure if he lost consciousness, if he did it may have been very brief.  He denies any chest pains, blurry vision, shortness of breath, palpitations, diaphoresis before falling/passing out.  He has a history of heart disease and had a cardiac catheterization done last Friday with a repeat scheduled for March 1.  He has a history of mild systolic dysfunction.    Past Medical History:   Diagnosis Date    Cancer (HCC)     melanoma.     Coronary artery disease 1/24/2011    Dyslipidemia     Essential hypertension, benign 1/24/2011      Past Surgical History:   Procedure Laterality Date    CARDIAC PROCEDURE N/A 2/16/2024    Left heart cath / coronary angiography performed by Rogelio Casey DO at [facility] CARDIAC CATH LAB    COLONOSCOPY N/A 5/13/2022    COLONOSCOPY performed by Min Tan MD at [facility] ENDOSCOPY    CORONARY ARTERY BYPASS GRAFT  1998    TOTAL KNEE ARTHROPLASTY Bilateral     left and right knee replacement.      Prior to Admission medications    Medication Sig Start Date End Date Taking? Authorizing Provider   Budeson-Glycopyrrol-Formoterol (BREZTRI AEROSPHERE) 160-9-4.8 MCG/ACT AERO Inhale into the lungs    Provider, MD Hector   Multiple Vitamin (MULTIVITAMIN ADULT

## 2024-02-20 LAB — ECHO BSA: 2.12 M2

## 2024-02-21 ENCOUNTER — TELEPHONE (OUTPATIENT)
Age: 74
End: 2024-02-21

## 2024-02-21 NOTE — TELEPHONE ENCOUNTER
Pt had a recent hospital visit - wanted to speak with you - pls call pt at home # at your convenience

## 2024-02-27 NOTE — PERIOP NOTES
Initial RN admission and assessment performed and documented in Endoscopy navigator. Patient evaluated by anesthesia in pre-procedure holding. All procedural vital signs, airway assessment, and level of consciousness information monitored and recorded by anesthesia staff on the anesthesia record. No

## 2024-02-28 RX ORDER — AMLODIPINE BESYLATE 10 MG/1
TABLET ORAL
Qty: 90 TABLET | Refills: 3 | Status: SHIPPED | OUTPATIENT
Start: 2024-02-28

## 2024-02-28 NOTE — TELEPHONE ENCOUNTER
Refill per VO of Dr. TANYA Casey, OD:    Last appt:  1/11/2024    Future Appointments   Date Time Provider Department Center   3/20/2024 10:20 AM Rogelio Casey DO [facility] AMB       Requested Prescriptions     Pending Prescriptions Disp Refills    amLODIPine (NORVASC) 10 MG tablet [Pharmacy Med Name: amLODIPine BESYLATE 10 MG TAB] 90 tablet      Sig: TAKE ONE TABLET BY MOUTH DAILY

## 2024-02-29 NOTE — TELEPHONE ENCOUNTER
Spoke To Pt:  Just a reminder not to forget your PCI of LAD/Diagonal w/Poss Atherectomy scheduled for tomorrow.  Arriving at 11:30am  You will need a  must stay on site  NPO from midnight   check in at the 2n floor outpt. reg. Desk.  Medications:  Hold HCTZ in the morning  VO by /nurse Cristela FULLER

## 2024-03-01 ENCOUNTER — HOSPITAL ENCOUNTER (OUTPATIENT)
Facility: HOSPITAL | Age: 74
Setting detail: OUTPATIENT SURGERY
Discharge: HOME | End: 2024-03-01
Attending: STUDENT IN AN ORGANIZED HEALTH CARE EDUCATION/TRAINING PROGRAM | Admitting: STUDENT IN AN ORGANIZED HEALTH CARE EDUCATION/TRAINING PROGRAM
Payer: MEDICARE

## 2024-03-01 VITALS
HEART RATE: 57 BPM | RESPIRATION RATE: 13 BRPM | OXYGEN SATURATION: 96 % | WEIGHT: 219.8 LBS | HEIGHT: 67 IN | TEMPERATURE: 97.4 F | BODY MASS INDEX: 34.5 KG/M2 | SYSTOLIC BLOOD PRESSURE: 148 MMHG | DIASTOLIC BLOOD PRESSURE: 56 MMHG

## 2024-03-01 DIAGNOSIS — I25.10 CAD (CORONARY ARTERY DISEASE): ICD-10-CM

## 2024-03-01 DIAGNOSIS — I25.10 CORONARY ARTERY DISEASE: ICD-10-CM

## 2024-03-01 DIAGNOSIS — R94.39 ABNORMAL STRESS TEST: ICD-10-CM

## 2024-03-01 DIAGNOSIS — R94.39 ABNORMAL CARDIOVASCULAR STRESS TEST: ICD-10-CM

## 2024-03-01 LAB
ACT BLD: 298 SECS (ref 79–138)
ACT BLD: 342 SECS (ref 79–138)
ECHO BSA: 2.17 M2

## 2024-03-01 PROCEDURE — 76937 US GUIDE VASCULAR ACCESS: CPT | Performed by: STUDENT IN AN ORGANIZED HEALTH CARE EDUCATION/TRAINING PROGRAM

## 2024-03-01 PROCEDURE — 92972 PERQ TRLUML CORONRY LITHOTRP: CPT | Performed by: STUDENT IN AN ORGANIZED HEALTH CARE EDUCATION/TRAINING PROGRAM

## 2024-03-01 PROCEDURE — C1887 CATHETER, GUIDING: HCPCS | Performed by: STUDENT IN AN ORGANIZED HEALTH CARE EDUCATION/TRAINING PROGRAM

## 2024-03-01 PROCEDURE — C9600 PERC DRUG-EL COR STENT SING: HCPCS | Performed by: STUDENT IN AN ORGANIZED HEALTH CARE EDUCATION/TRAINING PROGRAM

## 2024-03-01 PROCEDURE — 6360000004 HC RX CONTRAST MEDICATION: Performed by: STUDENT IN AN ORGANIZED HEALTH CARE EDUCATION/TRAINING PROGRAM

## 2024-03-01 PROCEDURE — C1725 CATH, TRANSLUMIN NON-LASER: HCPCS | Performed by: STUDENT IN AN ORGANIZED HEALTH CARE EDUCATION/TRAINING PROGRAM

## 2024-03-01 PROCEDURE — 93005 ELECTROCARDIOGRAM TRACING: CPT | Performed by: STUDENT IN AN ORGANIZED HEALTH CARE EDUCATION/TRAINING PROGRAM

## 2024-03-01 PROCEDURE — C1761 HC CATH TRANSLUM INTRAVASCULAR LITHOTRIPSY CORONARY: HCPCS | Performed by: STUDENT IN AN ORGANIZED HEALTH CARE EDUCATION/TRAINING PROGRAM

## 2024-03-01 PROCEDURE — 6360000002 HC RX W HCPCS: Performed by: STUDENT IN AN ORGANIZED HEALTH CARE EDUCATION/TRAINING PROGRAM

## 2024-03-01 PROCEDURE — 99152 MOD SED SAME PHYS/QHP 5/>YRS: CPT | Performed by: STUDENT IN AN ORGANIZED HEALTH CARE EDUCATION/TRAINING PROGRAM

## 2024-03-01 PROCEDURE — C1769 GUIDE WIRE: HCPCS | Performed by: STUDENT IN AN ORGANIZED HEALTH CARE EDUCATION/TRAINING PROGRAM

## 2024-03-01 PROCEDURE — C1874 STENT, COATED/COV W/DEL SYS: HCPCS | Performed by: STUDENT IN AN ORGANIZED HEALTH CARE EDUCATION/TRAINING PROGRAM

## 2024-03-01 PROCEDURE — 92978 ENDOLUMINL IVUS OCT C 1ST: CPT | Performed by: STUDENT IN AN ORGANIZED HEALTH CARE EDUCATION/TRAINING PROGRAM

## 2024-03-01 PROCEDURE — C9602 PERC D-E COR STENT ATHER S: HCPCS | Performed by: STUDENT IN AN ORGANIZED HEALTH CARE EDUCATION/TRAINING PROGRAM

## 2024-03-01 PROCEDURE — 92928 PRQ TCAT PLMT NTRAC ST 1 LES: CPT | Performed by: STUDENT IN AN ORGANIZED HEALTH CARE EDUCATION/TRAINING PROGRAM

## 2024-03-01 PROCEDURE — 37252 INTRVASC US NONCORONARY 1ST: CPT | Performed by: STUDENT IN AN ORGANIZED HEALTH CARE EDUCATION/TRAINING PROGRAM

## 2024-03-01 PROCEDURE — 85347 COAGULATION TIME ACTIVATED: CPT

## 2024-03-01 PROCEDURE — 6370000000 HC RX 637 (ALT 250 FOR IP): Performed by: STUDENT IN AN ORGANIZED HEALTH CARE EDUCATION/TRAINING PROGRAM

## 2024-03-01 PROCEDURE — C1753 CATH, INTRAVAS ULTRASOUND: HCPCS | Performed by: STUDENT IN AN ORGANIZED HEALTH CARE EDUCATION/TRAINING PROGRAM

## 2024-03-01 PROCEDURE — 2500000003 HC RX 250 WO HCPCS: Performed by: STUDENT IN AN ORGANIZED HEALTH CARE EDUCATION/TRAINING PROGRAM

## 2024-03-01 PROCEDURE — C1894 INTRO/SHEATH, NON-LASER: HCPCS | Performed by: STUDENT IN AN ORGANIZED HEALTH CARE EDUCATION/TRAINING PROGRAM

## 2024-03-01 PROCEDURE — 2709999900 HC NON-CHARGEABLE SUPPLY: Performed by: STUDENT IN AN ORGANIZED HEALTH CARE EDUCATION/TRAINING PROGRAM

## 2024-03-01 PROCEDURE — 94761 N-INVAS EAR/PLS OXIMETRY MLT: CPT

## 2024-03-01 PROCEDURE — 99153 MOD SED SAME PHYS/QHP EA: CPT | Performed by: STUDENT IN AN ORGANIZED HEALTH CARE EDUCATION/TRAINING PROGRAM

## 2024-03-01 DEVICE — STENT ONYXNG30038UX ONYX 3.00X38RX
Type: IMPLANTABLE DEVICE | Status: FUNCTIONAL
Brand: ONYX FRONTIER™

## 2024-03-01 RX ORDER — SODIUM CHLORIDE 0.9 % (FLUSH) 0.9 %
5-40 SYRINGE (ML) INJECTION PRN
Status: DISCONTINUED | OUTPATIENT
Start: 2024-03-01 | End: 2024-03-01 | Stop reason: HOSPADM

## 2024-03-01 RX ORDER — ACETAMINOPHEN 325 MG/1
650 TABLET ORAL EVERY 4 HOURS PRN
Status: DISCONTINUED | OUTPATIENT
Start: 2024-03-01 | End: 2024-03-01 | Stop reason: HOSPADM

## 2024-03-01 RX ORDER — IODIXANOL 320 MG/ML
INJECTION, SOLUTION INTRAVASCULAR PRN
Status: DISCONTINUED | OUTPATIENT
Start: 2024-03-01 | End: 2024-03-01 | Stop reason: HOSPADM

## 2024-03-01 RX ORDER — MIDAZOLAM HYDROCHLORIDE 1 MG/ML
INJECTION INTRAMUSCULAR; INTRAVENOUS PRN
Status: DISCONTINUED | OUTPATIENT
Start: 2024-03-01 | End: 2024-03-01 | Stop reason: HOSPADM

## 2024-03-01 RX ORDER — HEPARIN SODIUM 1000 [USP'U]/ML
INJECTION, SOLUTION INTRAVENOUS; SUBCUTANEOUS PRN
Status: DISCONTINUED | OUTPATIENT
Start: 2024-03-01 | End: 2024-03-01 | Stop reason: HOSPADM

## 2024-03-01 RX ORDER — LIDOCAINE HYDROCHLORIDE 10 MG/ML
INJECTION, SOLUTION INFILTRATION; PERINEURAL PRN
Status: DISCONTINUED | OUTPATIENT
Start: 2024-03-01 | End: 2024-03-01 | Stop reason: HOSPADM

## 2024-03-01 RX ORDER — FENTANYL CITRATE 50 UG/ML
INJECTION, SOLUTION INTRAMUSCULAR; INTRAVENOUS PRN
Status: DISCONTINUED | OUTPATIENT
Start: 2024-03-01 | End: 2024-03-01 | Stop reason: HOSPADM

## 2024-03-01 RX ORDER — SODIUM CHLORIDE 0.9 % (FLUSH) 0.9 %
5-40 SYRINGE (ML) INJECTION EVERY 12 HOURS SCHEDULED
Status: DISCONTINUED | OUTPATIENT
Start: 2024-03-01 | End: 2024-03-01 | Stop reason: HOSPADM

## 2024-03-01 RX ORDER — VERAPAMIL HYDROCHLORIDE 2.5 MG/ML
INJECTION, SOLUTION INTRAVENOUS PRN
Status: DISCONTINUED | OUTPATIENT
Start: 2024-03-01 | End: 2024-03-01 | Stop reason: HOSPADM

## 2024-03-01 RX ORDER — SODIUM CHLORIDE 9 MG/ML
INJECTION, SOLUTION INTRAVENOUS PRN
Status: DISCONTINUED | OUTPATIENT
Start: 2024-03-01 | End: 2024-03-01 | Stop reason: HOSPADM

## 2024-03-01 RX ORDER — HEPARIN SODIUM 10000 [USP'U]/ML
INJECTION, SOLUTION INTRAVENOUS; SUBCUTANEOUS PRN
Status: DISCONTINUED | OUTPATIENT
Start: 2024-03-01 | End: 2024-03-01 | Stop reason: HOSPADM

## 2024-03-01 RX ORDER — CLOPIDOGREL 300 MG/1
TABLET, FILM COATED ORAL PRN
Status: DISCONTINUED | OUTPATIENT
Start: 2024-03-01 | End: 2024-03-01 | Stop reason: HOSPADM

## 2024-03-01 NOTE — PROCEDURES
BRIEF PROCEDURE NOTE    Date of Procedure: 3/1/2024   Preoperative Diagnosis: stable CAD  Postoperative Diagnosis: multivessel CAD    Procedure: PCI w/ beena, ivus, IVL  Interventional Cardiologist: Rogelio Casey DO  Assistant: None  Anesthesia: local + IV moderate sedation   I administered moderate sedation throughout this procedure. An independent trained observer pushed medications at my direction, and monitored the patient’s level of consciousness and physiological status throughout.  Estimated Blood Loss: Minimal    Access: left radial artery, 6F  Catheters:  Left coronary: EBU 3.5 6F    Findings:   L Main:  appears to be short, large caliber without significant disease  LAD: large caliber, proximal calcified 90% stenosis with sequential 90% stenosis at origin of diagonal, lad distal to diagonal is subtotally occluded with competitive flow from LIMA    Ebu 3.5, 6F guide  Nestor blue  Ivus  Dilated proximal and mid-vessel lesion with 2.5 compliant balloon  Shockwave 3.0.  20 pulses in mid-lad proximal diagonal, 60 pulses in distal left main/proximal lad  Dilated proximal lad with 3.25 nc balloon    3.0x38mm chaitanya beena.  Deployed at 10 christian.  Withdrawn a few mm and deployed at high pressure    Post-dialted with 3.25 nc balloon at high pressure    Left main dilated with 3.75nc balloon at high pressure    Lukasz 3 flow pre and post      Specimens Removed: None    Implants: chaitanya beena    Closure Device: radial TR band    See full cath note.    Complications: none      Findings:  1.  Severe sequential stenosis of proximal lad into diagonal treated with IVL and BEENA      Plan:    DAPT x 6 months        DO Rogelio Vines DO  [address] [address], [address]  [place] [address]                                              Office [phone],Fax [phone]

## 2024-03-01 NOTE — DISCHARGE INSTRUCTIONS
Transradial Cardiac Catheterization/Angiography Discharge Instructions    It is normal to feel tired the first couple days.  Take it easy and follow the physician’s instructions.  Wear wrist splint for first 24 hours to keep the wrist stable. No repetitive flexing of wrist.       CHECK THE CATHETER INSERTION SITE DAILY:  Remove the wrist dressing 24 hours after the procedure.  You may shower 24 hours after the procedure.  Wash with soap and water and pat dry.  Gentle cleaning of the site with soap and water is sufficient, cover with a dry clean dressing or bandage.  Do not apply creams or powders to the area.  No soaking the wrist for 3 days.  Leave the puncture site open to air after 24 hours post-procedure.    CALL THE PHYSICIANS:   If you have signs of infection, such as:            - A fever  If the site becomes red, swollen or feels warm to the touch  If there is drainage or if there is unusual pain at the radial site.     MONITOR FOR BLEEDING:    If there is any minor oozing, you may apply a band-aid and remove after 12 hours.   If the bleeding continues or if there is a lot of bleeding hold pressure with the middle finger against the puncture site and the thumb against the back of the wrist,call 911 to be transported to the hospital.  DO NOT DRIVE YOURSELF, OR HAVE ANYONE ELSE DRIVE YOU - CALL 911.    ACTIVITY:   For the first 24 hours do not manipulate the wrist.  No lifting, pushing or pulling over 3-5 pounds with the affected wrist for 7 daysand no straining the insertion site. Do not life grocery bags or the garbage can, do not run the vacuum  or  for 7 days.  Start with short walks as in the hospital and gradually increase as tolerated each day.  It is recommended to walk 30 minutes 5-7 days per week.  Follow your physician’s instructions on activity.  Avoid walking outside in extremes of heat or cold.  Walk inside when it is cold and windy or hot and humid.     Things to keep in

## 2024-03-01 NOTE — PROGRESS NOTES
3/1/2024        RE: Shayne Birch         [address] [facility] [address] [facility] [address]          To Whom It May Concern,      Due to medical reasons, Shayne Birch may not  Return to work until  3/11/24.         Sincerely,          Haley Lim RN

## 2024-03-01 NOTE — PROGRESS NOTES
11:46 AM  Patient arrived. ID and allergies verified verbally with patient. Pt voices understanding of procedure to be performed. Consent obtained. Pt prepped for procedure.     1:35 PM  TRANSFER - OUT REPORT:    Verbal report given to Alyssa Birch  being transferred to cath lab for ordered procedure       Report consisted of patient's Situation, Background, Assessment and   Recommendations(SBAR).     Information from the following report(s) Nurse Handoff Report was reviewed with the receiving nurse.           Lines:   Peripheral IV 03/01/24 Left Antecubital (Active)        Opportunity for questions and clarification was provided.      Patient transported with:  Registered Nurse    3:00 PM  TRANSFER - IN REPORT:    Verbal report received from Carmela Birch  being received from Cath lab for routine post-op      Report consisted of patient's Situation, Background, Assessment and   Recommendations(SBAR).     Information from the following report(s) Nurse Handoff Report was reviewed with the receiving nurse.    Opportunity for questions and clarification was provided.      Assessment completed upon patient's arrival to unit and care assumed.     4:53 PM  2 ml air released from TR Band. No bleeding or hematoma noted. Radial and Ulnar pulse on right wrist palpable. Pt tolerated well. Will continue to monitor.    4:58 PM  3 ml air released from TR Band. No bleeding or hematoma noted. Radial and Ulnar pulse on right wrist palpable. Pt tolerated well. Will continue to monitor.    5:04 PM  4 ml air released from TR Band. No bleeding or hematoma noted. Radial and Ulnar pulse on right wrist palpable. Pt tolerated well. Will continue to monitor.    5:10 PM  3 ml air released from TR Band. No bleeding or hematoma noted. Radial and Ulnar pulse on right wrist palpable. Pt tolerated well. Will continue to monitor.    Air release completed. TR Band removed from right wrist. No bleeding or  Hematoma.  Dressing applied. Wrist immobilizer in place. Radial and ulnar pulse remain palpable on affected extremity. Pt tolerated well. Instructions given to pt regarding movement and activity restrictions. Pt voiced understanding.    5:10 PM  Discharge instructions and prescriptions reviewed with  patient and family. Opportunity provided for questions. Patient and family verbalized understanding. Signed copy of discharge placed in the front of patient's chart. IV and tele removed.     5:11 PM  Bedside report given to NEEL Jo. Right wrist clean, dry, and intact with no bleeding or hematoma noted.

## 2024-03-01 NOTE — PROGRESS NOTES
5:15 PM  Assumed care of patient. R radial site CDI, soft, no hematoma or bleeding noted.    6:20 PM  Patient ambulated to restroom.

## 2024-03-01 NOTE — INTERVAL H&P NOTE
Update History & Physical      Rogelio Casey DO  [facility]  [address]  [place] [address]                                       Office [phone],Fax [phone]    Pre- Cardiac Catheterization Procedure Note    Procedure:  PCI   Preprocedure diagnosis:  CAD    Preprocedure testin24    CARDIAC PROCEDURE 2024  2:29 PM (Final)    Conclusion    Severe multivessel CAD    Severe sequential stenosis of proximal lad into diagonal with subtotally occluded lad distal to diagonal that fills via lima    Lcx system appears to have severe negatively remodeling with myocardial perfusion imaging indicating infarction    Occluded vein grafts x 2    Findings:  L Main:  appears to be short, large caliber without significant disease  LAD: large caliber, proximal calcified 90% stenosis with sequential 90% stenosis at origin of diagonal, lad distal to diagonal is subtotally occluded, mid and distal lad fill via LIMA  LCx: proximal subtotal occlusion, om appear to be very negatively remodeled and fill via collaterals  RCA: moderate caliber, high bifurcation of PDA, diffuse moderate disease  Lima: widely patent  Both SVG ostially occluded    Signed by: Rogelio Casey DO on 2024  2:29 PM      History of Presenting Illness:  See prior office note. Progressive GODWIN.  Recent cardiac cath with severe proximal lad disease    Review of System:  Constitutional: 3  Resp: godwin  Card: occasional angina  Neuro: neg      No current facility-administered medications on file prior to encounter.     Current Outpatient Medications on File Prior to Encounter   Medication Sig Dispense Refill    Budeson-Glycopyrrol-Formoterol (BREZTRI AEROSPHERE) 160-9-4.8 MCG/ACT AERO Inhale into the lungs      Multiple Vitamin (MULTIVITAMIN ADULT PO) Take by mouth      clopidogrel (PLAVIX) 75 MG tablet Take 1 tablet by mouth daily 90 tablet 1    atorvastatin (LIPITOR) 80 MG tablet Take

## 2024-03-07 LAB
EKG ATRIAL RATE: 64 BPM
EKG DIAGNOSIS: NORMAL
EKG P AXIS: 64 DEGREES
EKG P-R INTERVAL: 262 MS
EKG Q-T INTERVAL: 440 MS
EKG QRS DURATION: 134 MS
EKG QTC CALCULATION (BAZETT): 453 MS
EKG R AXIS: 55 DEGREES
EKG T AXIS: 48 DEGREES
EKG VENTRICULAR RATE: 64 BPM

## 2024-03-10 LAB — ECHO BSA: 2.12 M2

## 2024-03-18 RX ORDER — HYDROCHLOROTHIAZIDE 25 MG/1
TABLET ORAL
Qty: 90 TABLET | Refills: 3 | Status: SHIPPED | OUTPATIENT
Start: 2024-03-18

## 2024-03-18 NOTE — TELEPHONE ENCOUNTER
Refill per VO of Dr. TANYA Casey, OD:    Last appt:  1/11/2024    Future Appointments   Date Time Provider Department Center   3/20/2024  9:00 AM Fidelia Hill MD [facility] BS AMB   3/20/2024 10:20 AM Rogelio Casey DO [facility] BS AMB       Requested Prescriptions     Pending Prescriptions Disp Refills    hydroCHLOROthiazide (HYDRODIURIL) 25 MG tablet [Pharmacy Med Name: hydroCHLOROthiazide 25 MG TABLET] 90 tablet      Sig: TAKE ONE TABLET BY MOUTH DAILY

## 2024-03-20 ENCOUNTER — OFFICE VISIT (OUTPATIENT)
Age: 74
End: 2024-03-20
Payer: MEDICARE

## 2024-03-20 ENCOUNTER — TELEPHONE (OUTPATIENT)
Age: 74
End: 2024-03-20

## 2024-03-20 VITALS
HEIGHT: 67 IN | OXYGEN SATURATION: 98 % | RESPIRATION RATE: 18 BRPM | HEART RATE: 60 BPM | SYSTOLIC BLOOD PRESSURE: 138 MMHG | BODY MASS INDEX: 35.31 KG/M2 | WEIGHT: 225 LBS | DIASTOLIC BLOOD PRESSURE: 60 MMHG

## 2024-03-20 VITALS
WEIGHT: 225.8 LBS | BODY MASS INDEX: 35.44 KG/M2 | HEIGHT: 67 IN | SYSTOLIC BLOOD PRESSURE: 138 MMHG | OXYGEN SATURATION: 98 % | DIASTOLIC BLOOD PRESSURE: 60 MMHG | HEART RATE: 60 BPM | RESPIRATION RATE: 18 BRPM

## 2024-03-20 DIAGNOSIS — I10 ESSENTIAL (PRIMARY) HYPERTENSION: ICD-10-CM

## 2024-03-20 DIAGNOSIS — I25.118 CORONARY ARTERY DISEASE OF NATIVE ARTERY OF NATIVE HEART WITH STABLE ANGINA PECTORIS (HCC): ICD-10-CM

## 2024-03-20 DIAGNOSIS — I25.118 CORONARY ARTERY DISEASE OF NATIVE ARTERY OF NATIVE HEART WITH STABLE ANGINA PECTORIS (HCC): Primary | ICD-10-CM

## 2024-03-20 DIAGNOSIS — I49.5 SSS (SICK SINUS SYNDROME) (HCC): ICD-10-CM

## 2024-03-20 DIAGNOSIS — Z87.898 HX OF SYNCOPE: ICD-10-CM

## 2024-03-20 DIAGNOSIS — R55 SYNCOPE AND COLLAPSE: ICD-10-CM

## 2024-03-20 PROCEDURE — 3017F COLORECTAL CA SCREEN DOC REV: CPT | Performed by: STUDENT IN AN ORGANIZED HEALTH CARE EDUCATION/TRAINING PROGRAM

## 2024-03-20 PROCEDURE — G8484 FLU IMMUNIZE NO ADMIN: HCPCS | Performed by: INTERNAL MEDICINE

## 2024-03-20 PROCEDURE — G8417 CALC BMI ABV UP PARAM F/U: HCPCS | Performed by: INTERNAL MEDICINE

## 2024-03-20 PROCEDURE — 3075F SYST BP GE 130 - 139MM HG: CPT | Performed by: STUDENT IN AN ORGANIZED HEALTH CARE EDUCATION/TRAINING PROGRAM

## 2024-03-20 PROCEDURE — G8417 CALC BMI ABV UP PARAM F/U: HCPCS | Performed by: STUDENT IN AN ORGANIZED HEALTH CARE EDUCATION/TRAINING PROGRAM

## 2024-03-20 PROCEDURE — 99205 OFFICE O/P NEW HI 60 MIN: CPT | Performed by: INTERNAL MEDICINE

## 2024-03-20 PROCEDURE — 1123F ACP DISCUSS/DSCN MKR DOCD: CPT | Performed by: INTERNAL MEDICINE

## 2024-03-20 PROCEDURE — 1036F TOBACCO NON-USER: CPT | Performed by: STUDENT IN AN ORGANIZED HEALTH CARE EDUCATION/TRAINING PROGRAM

## 2024-03-20 PROCEDURE — 3075F SYST BP GE 130 - 139MM HG: CPT | Performed by: INTERNAL MEDICINE

## 2024-03-20 PROCEDURE — G8484 FLU IMMUNIZE NO ADMIN: HCPCS | Performed by: STUDENT IN AN ORGANIZED HEALTH CARE EDUCATION/TRAINING PROGRAM

## 2024-03-20 PROCEDURE — 3078F DIAST BP <80 MM HG: CPT | Performed by: STUDENT IN AN ORGANIZED HEALTH CARE EDUCATION/TRAINING PROGRAM

## 2024-03-20 PROCEDURE — 1123F ACP DISCUSS/DSCN MKR DOCD: CPT | Performed by: STUDENT IN AN ORGANIZED HEALTH CARE EDUCATION/TRAINING PROGRAM

## 2024-03-20 PROCEDURE — 99214 OFFICE O/P EST MOD 30 MIN: CPT | Performed by: STUDENT IN AN ORGANIZED HEALTH CARE EDUCATION/TRAINING PROGRAM

## 2024-03-20 PROCEDURE — 3078F DIAST BP <80 MM HG: CPT | Performed by: INTERNAL MEDICINE

## 2024-03-20 PROCEDURE — G8427 DOCREV CUR MEDS BY ELIG CLIN: HCPCS | Performed by: INTERNAL MEDICINE

## 2024-03-20 PROCEDURE — G8427 DOCREV CUR MEDS BY ELIG CLIN: HCPCS | Performed by: STUDENT IN AN ORGANIZED HEALTH CARE EDUCATION/TRAINING PROGRAM

## 2024-03-20 PROCEDURE — 1036F TOBACCO NON-USER: CPT | Performed by: INTERNAL MEDICINE

## 2024-03-20 PROCEDURE — 3017F COLORECTAL CA SCREEN DOC REV: CPT | Performed by: INTERNAL MEDICINE

## 2024-03-20 RX ORDER — CHLORHEXIDINE GLUCONATE 4 G/100ML
SOLUTION TOPICAL
Qty: 1 EACH | Refills: 0 | Status: SHIPPED | OUTPATIENT
Start: 2024-03-20 | End: 2024-03-20

## 2024-03-20 ASSESSMENT — PATIENT HEALTH QUESTIONNAIRE - PHQ9
SUM OF ALL RESPONSES TO PHQ QUESTIONS 1-9: 0
SUM OF ALL RESPONSES TO PHQ QUESTIONS 1-9: 0
SUM OF ALL RESPONSES TO PHQ9 QUESTIONS 1 & 2: 0
SUM OF ALL RESPONSES TO PHQ QUESTIONS 1-9: 0
1. LITTLE INTEREST OR PLEASURE IN DOING THINGS: NOT AT ALL
SUM OF ALL RESPONSES TO PHQ QUESTIONS 1-9: 0
2. FEELING DOWN, DEPRESSED OR HOPELESS: NOT AT ALL

## 2024-03-20 NOTE — TELEPHONE ENCOUNTER
Spoke with Pt of New Implant Dual Chamber PPM schd. For 6/10/24 At 10am arrive at 8:30am Pt aware that they need a  they must stay on site NPO from Midnight the night before. Check in at the second floor Outpt. Reg. Desk. Pt is to have Labs done between 6/10/24-6/3/24. Medications:  Ok to take   VO by /nurse Carmela SEGURA

## 2024-03-20 NOTE — PATIENT INSTRUCTIONS
their position.    Do not reach above your head with the affected arm for 4 weeks, doing so increases the risk of lead dislodgement.      Avoid tight clothes or unnecessary pressure over your incision (such as bra straps or seat belts).  If it is tender or sensitive to clothing, cover the incision with a soft dressing or pad.    Avoid driving for 48 hours.    You may resume all other activities after 4 weeks (including exercise, driving, sex)      SHOWERING        Leave the bandage over your incision until your clinic follow up in 10-14 days after the Pacemaker implant.  You bandage will be removed in clinic during that appointment.     It is important to keep the bandaged area clean and dry.  You may shower around the site until the bandage is removed in clinic. Thereafter, you may shower after the bandage is removed, washing it gently with soap and water. Do not apply any lotions, powders, or perfumes to the incision line.    Avoid submerging your incision in water (tub baths, hot tubs, or swimming) for four weeks.     Underneath the dressing.    If you have white steri-strips over your incision (underneath the gauze dressing), they will curl up at the end and fall off, usually within 10 days.  Do not pull them off.  - OR -   You may have a different type of closure for the incision including a dermabond adhesive which will slowly peel and come off on its own once you are able to shower.       DISCHARGE PRECAUTIONS        You can have an MRI after 6 weeks.  You must be aware that any strong magnet or magnetic field can affect your Pacemaker.  In general, be careful of metal detectors, heavy machinery, and any area where arc-welding is performed.  When approaching a security checkpoint show your Pacemaker ID Card to security personnel.    Always tell your doctor or dentist that you have a Pacemaker.  In some cases, antibiotics may be prescribed before certain procedures.    Your temporary identification will be

## 2024-03-20 NOTE — PROGRESS NOTES
Cardiac Electrophysiology OFFICE Consultation Note       Assessment/Plan:   1. Syncope and collapse  -     CBC; Future  -     Basic Metabolic Panel; Future  2. Essential (primary) hypertension  -     CBC; Future  -     Basic Metabolic Panel; Future  3. Coronary artery disease of native artery of native heart with stable angina pectoris (HCC)  -     CBC; Future  -     Basic Metabolic Panel; Future       Primary Cardiologist: Jacinto      Syncope and collapse:   History of kareen syncope without prodrome. Not on BB at the time. Had trauma to his head. underwent Holter monitor after syncopal episode that showed pause of 2.9 seconds, 1st degree AVB also noted. HR as low as 27 bpm  - evidence of SSS and AV jacinda disease. Likely could benefit from BB in the future for CAD. He ultimately would benefit from PPM implantation.  - I have discussed the risks and benefits of pacemaker implant with the patient including but not limited to MI, death, bleeding, infection, lead dislodgement, pneumothorax, tamponade. Patient reports complete understanding of discussions and recommendations and wish to proceed with the procedure.  - Schedule dual chamber pacemaker  - Obtain labs prior to procedure (CMP, CBC)    Hypertension:   BP is well controlled on the current regimen. No change in antihypertensive medications today. Recommended routine exercise and low sodium diet.  - Continue amlodipine, HCTZ    CAD s/p CABG 90's:   underwent LHC 3/2024 with NESHA to LAD. Continue lipitor, plavix, and aspirin.    Subjective:       Shayne Birch is a 73 y.o. patient who is seen for evaluation of pause and history of syncope.     He had a syncopal episode, underwent Holter monitor that showed one pause of 2.9 seconds and 1st degree AVB. He denies symptoms of chest pain, dizziness, lightheadedness, and shortness of breath.    I independently review internal and external records of most recent labs, EKGs, event monitors or Holters, and imaging

## 2024-03-20 NOTE — PROGRESS NOTES
had concerns including Coronary Artery Disease, Hyperlipidemia, Hypertension, and Follow-up ([facility] 3/1).    Vitals:    03/20/24 1016   BP: 138/60   Site: Left Upper Arm   Position: Sitting   Pulse: 60   Resp: 18   SpO2: 98%   Weight: 102.1 kg (225 lb)   Height: 1.702 m (5' 7\")        Chest pain No    Refills No        1. Have you been to the ER, urgent care clinic since your last visit? No       Hospitalized since your last visit? No       Where?        Date?  
PDA, diffuse moderate disease  Lima: widely patent  Both SVG ostially occluded       CARDIAC PROCEDURE 03/01/2024  3:11 PM (Final)    Conclusion  Findings:  L Main:  appears to be short, large caliber without significant disease  LAD: large caliber, proximal calcified 90% stenosis with sequential 90% stenosis at origin of diagonal, lad distal to diagonal is subtotally occluded with competitive flow from LIMA    Ebu 3.5, 6F guide  Nestor blue  Ivus  Dilated proximal and mid-vessel lesion with 2.5 compliant balloon  Shockwave 3.0.  20 pulses in mid-lad proximal diagonal, 60 pulses in distal left main/proximal lad  Dilated proximal lad with 3.25 nc balloon    3.0x38mm chaitanya beena.  Deployed at 10 christian.  Withdrawn a few mm and deployed at high pressure    Post-dialted with 3.25 nc balloon at high pressure    Left main dilated with 3.75nc balloon at high pressure    Lukasz 3 flow pre and post      Specimens Removed: None    Implants: chaitanya beena    Closure Device: radial TR band    See full cath note.    Complications: none      Findings:  1.  Severe sequential stenosis of proximal lad into diagonal treated with IVL and BEENA    Signed by: Rogelio Casey DO on 3/1/2024  3:11 PM        Rogelio Casey DO

## 2024-03-20 NOTE — PROGRESS NOTES
Room #: 2    Chief Complaint   Patient presents with    New Patient    Loss of Consciousness     syncope    Other     pauses       Vitals:    03/20/24 0904   BP: 138/60   Site: Right Upper Arm   Position: Sitting   Cuff Size: Large Adult   Pulse: 60   Resp: 18   SpO2: 98%   Weight: 102.4 kg (225 lb 12.8 oz)   Height: 1.702 m (5' 7\")         Chest pain:  NO    Have you been to the ER, urgent care, or hospitalized outside of [facility] since your last visit?   NO    Refills:  NO

## 2024-04-23 ENCOUNTER — OFFICE VISIT (OUTPATIENT)
Age: 74
End: 2024-04-23

## 2024-04-23 VITALS
DIASTOLIC BLOOD PRESSURE: 68 MMHG | BODY MASS INDEX: 36.96 KG/M2 | WEIGHT: 230 LBS | HEART RATE: 60 BPM | SYSTOLIC BLOOD PRESSURE: 130 MMHG | OXYGEN SATURATION: 97 % | HEIGHT: 66 IN | TEMPERATURE: 97.8 F

## 2024-04-23 DIAGNOSIS — H15.102 SCLERITIS AND EPISCLERITIS OF LEFT EYE: Primary | ICD-10-CM

## 2024-04-23 DIAGNOSIS — H15.002 SCLERITIS AND EPISCLERITIS OF LEFT EYE: Primary | ICD-10-CM

## 2024-04-23 RX ORDER — AMLODIPINE BESYLATE 10 MG/1
10 TABLET ORAL DAILY
COMMUNITY

## 2024-04-23 RX ORDER — TAMSULOSIN HYDROCHLORIDE 0.4 MG/1
0.4 CAPSULE ORAL PRN
COMMUNITY

## 2024-04-23 RX ORDER — HYDROCHLOROTHIAZIDE 25 MG/1
25 TABLET ORAL DAILY
COMMUNITY

## 2024-04-23 NOTE — PROGRESS NOTES
Shayne Birch (:  1950) is a 73 y.o. male,New patient, here for evaluation of the following chief complaint(s):  Conjunctivitis (Sx for 6 days. Eye is red, slight drainage, swelling. Denies crust or itching. Has used dry eyes eye drops and a eye rinse. )      Assessment & Plan :  Visit Diagnoses and Associated Orders       Scleritis and episcleritis of left eye    -  Primary         ORDERS WITHOUT AN ASSOCIATED DIAGNOSIS    amLODIPine (NORVASC) 10 MG tablet [9069]      hydroCHLOROthiazide (HYDRODIURIL) 25 MG tablet [3720]      tamsulosin (FLOMAX) 0.4 MG capsule [278373]            Patient sent to [facility] for urgent optometry eval given photophobia and lack of typical conjunctivitis symptoms, concern for a scleritis or keratitis, needs a full eye exam. Called and made patient an appt at [facility] on [address] at 10:10 am and provided him the address and information. Patient states he is heading straight there.           Subjective :    History provided by:  Patient  Conjunctivitis       HPI:   73 y.o. male presents with symptoms of Conjunctivitis  Patient presents for evaluation of erythema, itching, pain, photophobia, and tearing in the left eye. Most notable is the erythema and photophobia, he states it teared once, very mild itching and pain. No crusting. Left eye only. He has noticed the above symptoms for 6 days. Onset was acute. Patient denies blurred vision and visual field deficit. States he has had something similar in the past and was seen at [facility] but does not recall what he was given. No other coryzal symptoms, no fevers, chills. No other complaints.         Vitals:    24 0905   BP: 130/68   Site: Left Upper Arm   Position: Sitting   Cuff Size: Large Adult   Pulse: 60   Temp: 97.8 °F (36.6 °C)   TempSrc: Oral   SpO2: 97%   Weight: 104.3 kg (230 lb)   Height: 1.676 m (5' 6\")          Objective   Physical Exam  Constitutional:       Appearance: Normal appearance.

## 2024-05-23 DIAGNOSIS — I25.118 CORONARY ARTERY DISEASE OF NATIVE ARTERY OF NATIVE HEART WITH STABLE ANGINA PECTORIS (HCC): ICD-10-CM

## 2024-05-23 DIAGNOSIS — I10 ESSENTIAL (PRIMARY) HYPERTENSION: ICD-10-CM

## 2024-05-23 DIAGNOSIS — R55 SYNCOPE AND COLLAPSE: ICD-10-CM

## 2024-05-23 LAB
ANION GAP SERPL CALC-SCNC: 6 MMOL/L (ref 5–15)
BUN SERPL-MCNC: 30 MG/DL (ref 6–20)
BUN/CREAT SERPL: 25 (ref 12–20)
CALCIUM SERPL-MCNC: 9.6 MG/DL (ref 8.5–10.1)
CHLORIDE SERPL-SCNC: 102 MMOL/L (ref 97–108)
CO2 SERPL-SCNC: 29 MMOL/L (ref 21–32)
CREAT SERPL-MCNC: 1.18 MG/DL (ref 0.7–1.3)
ERYTHROCYTE [DISTWIDTH] IN BLOOD BY AUTOMATED COUNT: 12.7 % (ref 11.5–14.5)
GLUCOSE SERPL-MCNC: 158 MG/DL (ref 65–100)
HCT VFR BLD AUTO: 44 % (ref 36.6–50.3)
HGB BLD-MCNC: 14.6 G/DL (ref 12.1–17)
MCH RBC QN AUTO: 31.3 PG (ref 26–34)
MCHC RBC AUTO-ENTMCNC: 33.2 G/DL (ref 30–36.5)
MCV RBC AUTO: 94.2 FL (ref 80–99)
NRBC # BLD: 0 K/UL (ref 0–0.01)
NRBC BLD-RTO: 0 PER 100 WBC
PLATELET # BLD AUTO: 202 K/UL (ref 150–400)
PMV BLD AUTO: 11.3 FL (ref 8.9–12.9)
POTASSIUM SERPL-SCNC: 4.1 MMOL/L (ref 3.5–5.1)
RBC # BLD AUTO: 4.67 M/UL (ref 4.1–5.7)
SODIUM SERPL-SCNC: 137 MMOL/L (ref 136–145)
WBC # BLD AUTO: 7.2 K/UL (ref 4.1–11.1)

## 2024-05-24 ENCOUNTER — PREP FOR PROCEDURE (OUTPATIENT)
Age: 74
End: 2024-05-24

## 2024-05-28 RX ORDER — SODIUM CHLORIDE 0.9 % (FLUSH) 0.9 %
5-40 SYRINGE (ML) INJECTION EVERY 12 HOURS SCHEDULED
Status: CANCELLED | OUTPATIENT
Start: 2024-05-28

## 2024-05-28 RX ORDER — SODIUM CHLORIDE 9 MG/ML
INJECTION, SOLUTION INTRAVENOUS PRN
Status: CANCELLED | OUTPATIENT
Start: 2024-05-28

## 2024-05-28 RX ORDER — SODIUM CHLORIDE 0.9 % (FLUSH) 0.9 %
5-40 SYRINGE (ML) INJECTION PRN
Status: CANCELLED | OUTPATIENT
Start: 2024-05-28

## 2024-06-04 ENCOUNTER — TELEPHONE (OUTPATIENT)
Age: 74
End: 2024-06-04

## 2024-06-04 RX ORDER — CHLORHEXIDINE GLUCONATE 40 MG/ML
SOLUTION TOPICAL NIGHTLY
Qty: 1 EACH | Refills: 0 | Status: SHIPPED | OUTPATIENT
Start: 2024-06-04

## 2024-06-04 NOTE — TELEPHONE ENCOUNTER
Patient request a call back from nurse concerning a few questions he has about his Insert PPM procedure scheduled on 6/10.     Patient # [phone]

## 2024-06-04 NOTE — TELEPHONE ENCOUNTER
Returned patient call, ID verified using two patient identifiers.  Mr. Birch is calling to review his instructions for his upcoming pacemaker procedure on 6/10/24.    Reviewed that he is to take all of his medications with a small sip of water the morning of his procedure.  He is scheduled for a 10:00 am procedure so his arrival time is 8:00 am.    Resent prescription for hibiclens.  Patient verbalized understanding and will call back with any other questions or concerns.    Future Appointments   Date Time Provider Department Center   6/20/2024  9:20 AM PACEMAKER, ULISES [facility] AMB   9/20/2024 11:00 AM PACEMAKER, STANTHONY [facility] AM[facility]   9/20/2024 11:20 AM Fidelia Hill MD [facility] AMB   10/1/2024  9:20 AM Rogelio Casey DO [facility] AM[facility]

## 2024-06-05 RX ORDER — AMLODIPINE BESYLATE 10 MG/1
10 TABLET ORAL DAILY
Qty: 90 TABLET | Refills: 3 | Status: SHIPPED | OUTPATIENT
Start: 2024-06-05

## 2024-06-05 RX ORDER — HYDROCHLOROTHIAZIDE 25 MG/1
25 TABLET ORAL DAILY
Qty: 90 TABLET | Refills: 3 | Status: SHIPPED | OUTPATIENT
Start: 2024-06-05

## 2024-06-05 NOTE — TELEPHONE ENCOUNTER
Refill per VO of Dr. TANYA Casey, OD:    Last appt:  3/20/2024    Future Appointments   Date Time Provider Department Center   6/20/2024  9:20 AM PACEMAKER, ULISES FIGUEROA [facility] AMB   9/20/2024 11:00 AM PACEMAKER, ULISES [facility] AMB   9/20/2024 11:20 AM Fidelia Hill MD [facility] AMB   10/1/2024  9:20 AM Rogelio Casey DO [facility] AMB       Requested Prescriptions     Pending Prescriptions Disp Refills    amLODIPine (NORVASC) 10 MG tablet 90 tablet 3     Sig: Take 1 tablet by mouth daily    hydroCHLOROthiazide (HYDRODIURIL) 25 MG tablet 90 tablet 3     Sig: Take 1 tablet by mouth daily

## 2024-06-06 ENCOUNTER — TELEPHONE (OUTPATIENT)
Age: 74
End: 2024-06-06

## 2024-06-06 NOTE — TELEPHONE ENCOUNTER
Spoke to patient and confirmed his procedure with Dr. Hill 6/10 @ 10:00 am with 8:00 am arrival at [place].    Patient identification verified x2.

## 2024-06-10 ENCOUNTER — APPOINTMENT (OUTPATIENT)
Facility: HOSPITAL | Age: 74
End: 2024-06-10
Attending: INTERNAL MEDICINE
Payer: MEDICARE

## 2024-06-10 ENCOUNTER — HOSPITAL ENCOUNTER (OUTPATIENT)
Facility: HOSPITAL | Age: 74
Setting detail: OUTPATIENT SURGERY
Discharge: HOME | End: 2024-06-10
Attending: INTERNAL MEDICINE | Admitting: INTERNAL MEDICINE
Payer: MEDICARE

## 2024-06-10 VITALS
BODY MASS INDEX: 35.14 KG/M2 | SYSTOLIC BLOOD PRESSURE: 120 MMHG | HEIGHT: 67 IN | HEART RATE: 64 BPM | WEIGHT: 223.9 LBS | DIASTOLIC BLOOD PRESSURE: 50 MMHG | RESPIRATION RATE: 16 BRPM | OXYGEN SATURATION: 91 % | TEMPERATURE: 97.7 F

## 2024-06-10 DIAGNOSIS — R55 SYNCOPE AND COLLAPSE: ICD-10-CM

## 2024-06-10 LAB — ECHO BSA: 2.19 M2

## 2024-06-10 PROCEDURE — C1785 PMKR, DUAL, RATE-RESP: HCPCS | Performed by: INTERNAL MEDICINE

## 2024-06-10 PROCEDURE — 33208 INSRT HEART PM ATRIAL & VENT: CPT | Performed by: INTERNAL MEDICINE

## 2024-06-10 PROCEDURE — 71045 X-RAY EXAM CHEST 1 VIEW: CPT

## 2024-06-10 PROCEDURE — 2500000003 HC RX 250 WO HCPCS: Performed by: INTERNAL MEDICINE

## 2024-06-10 PROCEDURE — 99152 MOD SED SAME PHYS/QHP 5/>YRS: CPT | Performed by: INTERNAL MEDICINE

## 2024-06-10 PROCEDURE — 2709999900 HC NON-CHARGEABLE SUPPLY: Performed by: INTERNAL MEDICINE

## 2024-06-10 PROCEDURE — C1892 INTRO/SHEATH,FIXED,PEEL-AWAY: HCPCS | Performed by: INTERNAL MEDICINE

## 2024-06-10 PROCEDURE — 76937 US GUIDE VASCULAR ACCESS: CPT | Performed by: INTERNAL MEDICINE

## 2024-06-10 PROCEDURE — 6360000002 HC RX W HCPCS: Performed by: INTERNAL MEDICINE

## 2024-06-10 PROCEDURE — C1894 INTRO/SHEATH, NON-LASER: HCPCS | Performed by: INTERNAL MEDICINE

## 2024-06-10 PROCEDURE — 99153 MOD SED SAME PHYS/QHP EA: CPT | Performed by: INTERNAL MEDICINE

## 2024-06-10 PROCEDURE — C1898 LEAD, PMKR, OTHER THAN TRANS: HCPCS | Performed by: INTERNAL MEDICINE

## 2024-06-10 DEVICE — LEAD 5076-52 MRI US RCMCRD
Type: IMPLANTABLE DEVICE | Status: FUNCTIONAL
Brand: CAPSUREFIX NOVUS MRI™ SURESCAN®

## 2024-06-10 DEVICE — LEAD 5076-58 MRI US RCMCRD
Type: IMPLANTABLE DEVICE | Status: FUNCTIONAL
Brand: CAPSUREFIX NOVUS MRI™ SURESCAN®

## 2024-06-10 DEVICE — IPG W1DR01 AZURE XT DR MRI USA
Type: IMPLANTABLE DEVICE | Status: FUNCTIONAL
Brand: AZURE™ XT DR MRI SURESCAN™

## 2024-06-10 RX ORDER — SODIUM CHLORIDE 0.9 % (FLUSH) 0.9 %
5-40 SYRINGE (ML) INJECTION EVERY 12 HOURS SCHEDULED
Status: DISCONTINUED | OUTPATIENT
Start: 2024-06-10 | End: 2024-06-10 | Stop reason: HOSPADM

## 2024-06-10 RX ORDER — OXYCODONE HYDROCHLORIDE 5 MG/1
5 TABLET ORAL EVERY 4 HOURS PRN
Status: DISCONTINUED | OUTPATIENT
Start: 2024-06-10 | End: 2024-06-10 | Stop reason: HOSPADM

## 2024-06-10 RX ORDER — ONDANSETRON 2 MG/ML
4 INJECTION INTRAMUSCULAR; INTRAVENOUS EVERY 6 HOURS PRN
Status: DISCONTINUED | OUTPATIENT
Start: 2024-06-10 | End: 2024-06-10 | Stop reason: HOSPADM

## 2024-06-10 RX ORDER — SODIUM CHLORIDE 0.9 % (FLUSH) 0.9 %
5-40 SYRINGE (ML) INJECTION PRN
Status: DISCONTINUED | OUTPATIENT
Start: 2024-06-10 | End: 2024-06-10 | Stop reason: HOSPADM

## 2024-06-10 RX ORDER — OXYCODONE HYDROCHLORIDE 5 MG/1
10 TABLET ORAL EVERY 4 HOURS PRN
Status: DISCONTINUED | OUTPATIENT
Start: 2024-06-10 | End: 2024-06-10 | Stop reason: HOSPADM

## 2024-06-10 RX ORDER — SODIUM CHLORIDE 9 MG/ML
INJECTION, SOLUTION INTRAVENOUS PRN
Status: DISCONTINUED | OUTPATIENT
Start: 2024-06-10 | End: 2024-06-10 | Stop reason: HOSPADM

## 2024-06-10 RX ORDER — MIDAZOLAM HYDROCHLORIDE 1 MG/ML
INJECTION INTRAMUSCULAR; INTRAVENOUS PRN
Status: DISCONTINUED | OUTPATIENT
Start: 2024-06-10 | End: 2024-06-10 | Stop reason: HOSPADM

## 2024-06-10 RX ORDER — LIDOCAINE HYDROCHLORIDE 10 MG/ML
INJECTION, SOLUTION INFILTRATION; PERINEURAL PRN
Status: DISCONTINUED | OUTPATIENT
Start: 2024-06-10 | End: 2024-06-10 | Stop reason: HOSPADM

## 2024-06-10 RX ORDER — ACETAMINOPHEN 325 MG/1
650 TABLET ORAL EVERY 4 HOURS PRN
Status: DISCONTINUED | OUTPATIENT
Start: 2024-06-10 | End: 2024-06-10 | Stop reason: HOSPADM

## 2024-06-10 RX ORDER — FENTANYL CITRATE 50 UG/ML
INJECTION, SOLUTION INTRAMUSCULAR; INTRAVENOUS PRN
Status: DISCONTINUED | OUTPATIENT
Start: 2024-06-10 | End: 2024-06-10 | Stop reason: HOSPADM

## 2024-06-10 RX ORDER — LIDOCAINE HYDROCHLORIDE AND EPINEPHRINE 10; 10 MG/ML; UG/ML
INJECTION, SOLUTION INFILTRATION; PERINEURAL PRN
Status: DISCONTINUED | OUTPATIENT
Start: 2024-06-10 | End: 2024-06-10 | Stop reason: HOSPADM

## 2024-06-10 RX ORDER — CEFAZOLIN SODIUM 1 G/3ML
INJECTION, POWDER, FOR SOLUTION INTRAMUSCULAR; INTRAVENOUS PRN
Status: DISCONTINUED | OUTPATIENT
Start: 2024-06-10 | End: 2024-06-10 | Stop reason: HOSPADM

## 2024-06-10 NOTE — DISCHARGE INSTRUCTIONS
Pacemaker  Discharge Instructions      Please make sure you have received your Temporary Pacemaker identification card with your discharge instructions      MEDICATIONS        Take only the medications prescribed to you at discharge.      ACTIVITY        Return to your normal activity, except as noted below.    Wear the sling for the first 24 hours.  You may remove the sling after 24 hours and wear it as needed.  It is important to move the affected arm to prevent shoulder stiffness and locking.    Do not lift anything heavier than 10 pounds for 4 weeks with the affected arm.  This is how long it takes the muscles to heal, and the leads inside your heart to stabilize their position.    Do not reach above your head with the affected arm for 4 weeks, doing so increases the risk of lead dislodgement.      Avoid tight clothes or unnecessary pressure over your incision (such as bra straps or seat belts).  If it is tender or sensitive to clothing, cover the incision with a soft dressing or pad.    Avoid driving for 48 hours.    You may resume all other activities after 4 weeks (including exercise, driving, sex)      SHOWERING        Leave the bandage over your incision until your clinic follow up in 10-14 days after the Pacemaker implant.  You bandage will be removed in clinic during that appointment.     It is important to keep the bandaged area clean and dry.  You may shower around the site until the bandage is removed in clinic. Thereafter, you may shower after the bandage is removed, washing it gently with soap and water. Do not apply any lotions, powders, or perfumes to the incision line.    Avoid submerging your incision in water (tub baths, hot tubs, or swimming) for four weeks.     Underneath the dressing.    If you have white steri-strips over your incision (underneath the gauze dressing), they will curl up at the end and fall off, usually within 10 days.  Do not pull them off.  - OR -   You may have a

## 2024-06-10 NOTE — H&P
Office note from 3/20/24 reviewed. No interim changes.       ---------------------------------------------------------------            Cardiac Electrophysiology OFFICE Consultation Note         Assessment/Plan:   1. Syncope and collapse  -     CBC; Future  -     Basic Metabolic Panel; Future  2. Essential (primary) hypertension  -     CBC; Future  -     Basic Metabolic Panel; Future  3. Coronary artery disease of native artery of native heart with stable angina pectoris (HCC)  -     CBC; Future  -     Basic Metabolic Panel; Future        Primary Cardiologist: Jacinto        Syncope and collapse:   History of kareen syncope without prodrome. Not on BB at the time. Had trauma to his head. underwent Holter monitor after syncopal episode that showed pause of 2.9 seconds, 1st degree AVB also noted. HR as low as 27 bpm  - evidence of SSS and AV jacinda disease. Likely could benefit from BB in the future for CAD. He ultimately would benefit from PPM implantation.  - I have discussed the risks and benefits of pacemaker implant with the patient including but not limited to MI, death, bleeding, infection, lead dislodgement, pneumothorax, tamponade. Patient reports complete understanding of discussions and recommendations and wish to proceed with the procedure.  - Schedule dual chamber pacemaker  - Obtain labs prior to procedure (CMP, CBC)     Hypertension:   BP is well controlled on the current regimen. No change in antihypertensive medications today. Recommended routine exercise and low sodium diet.  - Continue amlodipine, HCTZ     CAD s/p CABG 90's:   underwent C 3/2024 with NESHA to LAD. Continue lipitor, plavix, and aspirin.     Subjective:       Shayne Birch is a 73 y.o. patient who is seen for evaluation of pause and history of syncope.     He had a syncopal episode, underwent Holter monitor that showed one pause of 2.9 seconds and 1st degree AVB. He denies symptoms of chest pain, dizziness, lightheadedness, and

## 2024-06-10 NOTE — PROGRESS NOTES
6/10/2024        RE: Shayne Birch         [id number] [facility] [address]          To Whom It May Concern,      Due to medical reasons, Shayne Birch may not  Return to work until  7/8/24 due to lifting restriction.         Sincerely,          Haley Lim RN    
sat on side of bed then ambulated around unit and to restroom. Voided. Returned to chair. Left chest site dressing dry and intact. No bleeding or hematoma. Pt voices no complaints.    2:40 PM  Rep at bedside to talk with patient    3:00 PM  Pt discharged via wheelchair with wife. Personal belongings with patient upon discharge.

## 2024-06-10 NOTE — PROCEDURES
Pacemaker Implantation    Procedure Date: 6/10/2024  Lab Physician: Fidelia Hill MD, [facility], [facility]    INDICATIONS:  74 yo gentleman with a history of HTN, CAD, history of syncope and collapse with pauses of 3 seconds with evidence of symptomatic bradycardia down to the 27 bpm now referred for pacemaker implantation.     COMMENTS:  After informed consent was obtained, the patient was brought to the electrophysiology laboratory in the fasting state, and was prepped and draped in the usual sterile fashion. IV antibiotic was administered prophylactically. Conscious sedation was administered by nursing staff independent of those performing the procedure under my supervision with intermittent dosing of anxiolytics and narcotics for a total sedation time of 42 mins.    Ultrasound-guided Access  Local anesthetic was delivered to the left pectoral region and an incision was made in the left deltopectoral groove. The axillary vein was accessed using a micropuncture needle with ultrasound guidance (ultrasound evaluation of possible access sites. Patency of the selected vessel.  Realtime visualization of the vascular needle entry was performed) and the vein was cannulated and a retaining wire was placed in the IVC under fluoroscopy. A 7F peel-away sheath was placed in the vein and a Medtronic lead was advanced to the RV apex under fluoroscopic guidance. Ventricular sensing and pacing thresholds were checked and were good.    A 7F peel away sheath was then placed in the vein along with the ventricular lead over the retained glide wire, and a Medtronic lead was placed in the RAA under fluoroscopic guidance. Atrial pacing and sensing thresholds were checked and were good. Pacing at 10V was performed from the ventricular lead without diaphragmatic or intercostal capture. The leads were sutured to the underlying pectoralis muscle using 0 Silk suture. Final pacing and sensing thresholds were checked and were good.     Wound Closure  A

## 2024-06-20 ENCOUNTER — PROCEDURE VISIT (OUTPATIENT)
Age: 74
End: 2024-06-20

## 2024-06-20 DIAGNOSIS — Z95.0 CARDIAC PACEMAKER IN SITU: Primary | ICD-10-CM

## 2024-06-20 NOTE — PROGRESS NOTES
Patient presents for wound check post-device implantation. The dressing was removed and the site was inspected. The site appeared to be well-healing without ecchymosis/tenderness/erythema. Denies pain, fevers, discharge.       Future Appointments   Date Time Provider Department Center   9/20/2024 11:00 AM PACEMAKERULISES [facility] AMB   9/20/2024 11:20 AM Fidelia Hill MD [facility] AMB   10/1/2024  9:20 AM Rogelio Casey DO [facility] AMB         Continue follow up in device clinic as planned.

## 2024-07-01 ENCOUNTER — TELEPHONE (OUTPATIENT)
Age: 74
End: 2024-07-01

## 2024-07-01 NOTE — TELEPHONE ENCOUNTER
Returned patient call, ID verified using two patient identifiers.  Mr. Birch is asking for a return to work note.  He is planning to go back to work on 7/8/24.    He works for [employer] part-time - 4 hours a day - 4 days a week.  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  He states his job is very physical every day and he does have to lift greater than 10 pounds.    Discussed his steri-strips are still in place and he wants to know if that is ok.  Advised him I would leave the strips in place for one more week before he tries to remove them himself.    He asked if he can use his battery operated push mower.  Advised him that he needs to wait the full 4 weeks before he does any pushing or pulling.    Advised him that I will have a letter completed by the end of the day.  He would like to come pick it up from the office.    Future Appointments   Date Time Provider Department Center   9/20/2024 11:00 AM PACEMAKER, STFRANCES [facility] AMB   9/20/2024 11:20 AM Fidelia Hill MD [facility] AMB   10/1/2024  9:20 AM Rogelio aCsey DO [facility] AMB

## 2024-07-01 NOTE — TELEPHONE ENCOUNTER
Patient is calling because he need a clearance letter to return to work and note any restrictions he has to have.Patient also would like to speak with the nurse about some other details.    [phone]

## 2024-07-16 ENCOUNTER — TELEPHONE (OUTPATIENT)
Age: 74
End: 2024-07-16

## 2024-07-16 NOTE — TELEPHONE ENCOUNTER
Future Appointments   Date Time Provider Department Center   7/19/2024 10:00 AM PACEMAKER3, MARGIE [facility] AMB   7/19/2024 10:20 AM Meghann Barrett, APRN - NP [facility] AMB   10/1/2024  9:20 AM Rogelio Casey DO [facility] AMB

## 2024-07-16 NOTE — TELEPHONE ENCOUNTER
Patient is calling and would like to speak with the nurse about assisting him with rescheduling his appointment since [place].      [phone]

## 2024-07-16 NOTE — PROGRESS NOTES
Regular rate and rhythm.  No murmur, click, rub or gallop.   Abdomen:     Soft, non-tender. Bowel sounds normal.    MSK:   Extremities normal, atraumatic.  Moves extremities independently.   Vasc/lymph:   No lower extremity edema.   Skin:   Skin color normal. No rashes or lesions on visible areas.  Left chest pacemaker site well healed.   Neurologic:   Alert, moves all extremities.        Data Review:     Radiology:   XR Results (most recent):    CT Result (most recent):  CT MAXILLOFACIAL WO CONTRAST 02/19/2024    Narrative  EXAM: CT MAXILLOFACIAL WO CONTRAST    INDICATION: right sided facial injury    COMPARISON: None.    CONTRAST:   None.    TECHNIQUE:  Multislice helical CT of the facial bones was performed in the axial  plane without intravenous contrast administration. Coronal and sagittal  reformations were generated.  CT dose reduction was achieved through use of a  standardized protocol tailored for this examination and automatic exposure  control for dose modulation.    FINDINGS:    Bones: There is no fracture or other osseous abnormality    Paranasal sinuses: Clear    Orbits: The globes, optic nerves, and extraocular muscles are within normal  limits.    Base of brain: Limited evaluation. No evidence of pathology.    Soft tissues: There is mild mucosal thickening maxillary sinuses. Mucosal septum  is deviated to the left    Miscellaneous: There is artifact related to dental amalgam    Impression  No fracture is identified    MRI Result (most recent):  No results found for this or any previous visit from the past 3650 days.          Current meds:  Current Outpatient Medications   Medication Sig Dispense Refill    amLODIPine (NORVASC) 10 MG tablet Take 1 tablet by mouth daily 90 tablet 3    hydroCHLOROthiazide (HYDRODIURIL) 25 MG tablet Take 1 tablet by mouth daily 90 tablet 3    tamsulosin (FLOMAX) 0.4 MG capsule Take 1 capsule by mouth as needed      Budeson-Glycopyrrol-Formoterol (BREZTRI AEROSPHERE)

## 2024-07-19 ENCOUNTER — PROCEDURE VISIT (OUTPATIENT)
Age: 74
End: 2024-07-19
Payer: MEDICARE

## 2024-07-19 ENCOUNTER — OFFICE VISIT (OUTPATIENT)
Age: 74
End: 2024-07-19
Payer: MEDICARE

## 2024-07-19 VITALS
OXYGEN SATURATION: 98 % | DIASTOLIC BLOOD PRESSURE: 58 MMHG | BODY MASS INDEX: 36.1 KG/M2 | HEART RATE: 73 BPM | WEIGHT: 230 LBS | HEIGHT: 67 IN | SYSTOLIC BLOOD PRESSURE: 128 MMHG

## 2024-07-19 DIAGNOSIS — I49.5 SSS (SICK SINUS SYNDROME) (HCC): ICD-10-CM

## 2024-07-19 DIAGNOSIS — I10 PRIMARY HYPERTENSION: ICD-10-CM

## 2024-07-19 DIAGNOSIS — I42.0 DILATED CARDIOMYOPATHY (HCC): ICD-10-CM

## 2024-07-19 DIAGNOSIS — Z95.1 HISTORY OF CORONARY ARTERY BYPASS GRAFT: ICD-10-CM

## 2024-07-19 DIAGNOSIS — Z95.0 CARDIAC PACEMAKER IN SITU: Primary | ICD-10-CM

## 2024-07-19 DIAGNOSIS — Z95.0 PACEMAKER: Primary | ICD-10-CM

## 2024-07-19 DIAGNOSIS — Z95.5 HISTORY OF CORONARY ARTERY STENT PLACEMENT: ICD-10-CM

## 2024-07-19 DIAGNOSIS — I25.10 CORONARY ARTERY DISEASE INVOLVING NATIVE CORONARY ARTERY OF NATIVE HEART WITHOUT ANGINA PECTORIS: ICD-10-CM

## 2024-07-19 PROCEDURE — 99214 OFFICE O/P EST MOD 30 MIN: CPT | Performed by: NURSE PRACTITIONER

## 2024-07-19 PROCEDURE — 93280 PM DEVICE PROGR EVAL DUAL: CPT | Performed by: INTERNAL MEDICINE

## 2024-07-19 ASSESSMENT — PATIENT HEALTH QUESTIONNAIRE - PHQ9
SUM OF ALL RESPONSES TO PHQ QUESTIONS 1-9: 0
1. LITTLE INTEREST OR PLEASURE IN DOING THINGS: NOT AT ALL
2. FEELING DOWN, DEPRESSED OR HOPELESS: NOT AT ALL
SUM OF ALL RESPONSES TO PHQ QUESTIONS 1-9: 0
SUM OF ALL RESPONSES TO PHQ9 QUESTIONS 1 & 2: 0

## 2024-08-09 RX ORDER — CLOPIDOGREL BISULFATE 75 MG/1
75 TABLET ORAL DAILY
Qty: 90 TABLET | Refills: 1 | Status: SHIPPED | OUTPATIENT
Start: 2024-08-09

## 2024-08-09 NOTE — TELEPHONE ENCOUNTER
Refill per VO of Dr. TANYA Casey, OD:    Last appt:  6/20/2024    Future Appointments   Date Time Provider Department Center   10/1/2024  9:20 AM Miah Casey DO [facility]   7/25/2025  9:20 AM PACEMAKER, STFRANCES [facility] AMB   7/25/2025  9:40 AM Fidelia Hill MD [facility] AMB       Requested Prescriptions     Signed Prescriptions Disp Refills    clopidogrel (PLAVIX) 75 MG tablet 90 tablet 1     Sig: Take 1 tablet by mouth daily     Authorizing Provider: MIAH CASEY     Ordering User: SVEN GARG

## 2024-08-12 RX ORDER — CLOPIDOGREL BISULFATE 75 MG/1
75 TABLET ORAL DAILY
Qty: 90 TABLET | Refills: 1 | OUTPATIENT
Start: 2024-08-12

## 2024-08-12 NOTE — TELEPHONE ENCOUNTER
Refill per VO of Dr. TANYA Casey, OD:    Last appt:  6/20/2024    Future Appointments   Date Time Provider Department Center   10/1/2024  9:20 AM Rogelio Casey DO [facility] BS AMB   7/25/2025  9:20 AM BARBARA, ULISES GARRIDO BS AMB   7/25/2025  9:40 AM Fidelia Hill MD [facility] BS AMB       Requested Prescriptions     Pending Prescriptions Disp Refills    clopidogrel (PLAVIX) 75 MG tablet [Pharmacy Med Name: CLOPIDOGREL 75 MG TABLET] 90 tablet 1     Sig: TAKE 1 TABLET BY MOUTH DAILY

## 2024-10-01 ENCOUNTER — OFFICE VISIT (OUTPATIENT)
Age: 74
End: 2024-10-01
Payer: MEDICARE

## 2024-10-01 VITALS
BODY MASS INDEX: 35.63 KG/M2 | OXYGEN SATURATION: 95 % | HEART RATE: 74 BPM | SYSTOLIC BLOOD PRESSURE: 134 MMHG | WEIGHT: 227 LBS | DIASTOLIC BLOOD PRESSURE: 82 MMHG | HEIGHT: 67 IN

## 2024-10-01 DIAGNOSIS — I49.5 SSS (SICK SINUS SYNDROME) (HCC): ICD-10-CM

## 2024-10-01 DIAGNOSIS — I51.9 LV DYSFUNCTION: ICD-10-CM

## 2024-10-01 DIAGNOSIS — I25.10 CORONARY ARTERY DISEASE INVOLVING NATIVE CORONARY ARTERY OF NATIVE HEART WITHOUT ANGINA PECTORIS: Primary | ICD-10-CM

## 2024-10-01 DIAGNOSIS — Z95.0 CARDIAC PACEMAKER IN SITU: ICD-10-CM

## 2024-10-01 DIAGNOSIS — I10 ESSENTIAL (PRIMARY) HYPERTENSION: ICD-10-CM

## 2024-10-01 PROCEDURE — 1123F ACP DISCUSS/DSCN MKR DOCD: CPT | Performed by: STUDENT IN AN ORGANIZED HEALTH CARE EDUCATION/TRAINING PROGRAM

## 2024-10-01 PROCEDURE — G8427 DOCREV CUR MEDS BY ELIG CLIN: HCPCS | Performed by: STUDENT IN AN ORGANIZED HEALTH CARE EDUCATION/TRAINING PROGRAM

## 2024-10-01 PROCEDURE — G8417 CALC BMI ABV UP PARAM F/U: HCPCS | Performed by: STUDENT IN AN ORGANIZED HEALTH CARE EDUCATION/TRAINING PROGRAM

## 2024-10-01 PROCEDURE — 3079F DIAST BP 80-89 MM HG: CPT | Performed by: STUDENT IN AN ORGANIZED HEALTH CARE EDUCATION/TRAINING PROGRAM

## 2024-10-01 PROCEDURE — G8484 FLU IMMUNIZE NO ADMIN: HCPCS | Performed by: STUDENT IN AN ORGANIZED HEALTH CARE EDUCATION/TRAINING PROGRAM

## 2024-10-01 PROCEDURE — 3075F SYST BP GE 130 - 139MM HG: CPT | Performed by: STUDENT IN AN ORGANIZED HEALTH CARE EDUCATION/TRAINING PROGRAM

## 2024-10-01 PROCEDURE — 99214 OFFICE O/P EST MOD 30 MIN: CPT | Performed by: STUDENT IN AN ORGANIZED HEALTH CARE EDUCATION/TRAINING PROGRAM

## 2024-10-01 PROCEDURE — 1036F TOBACCO NON-USER: CPT | Performed by: STUDENT IN AN ORGANIZED HEALTH CARE EDUCATION/TRAINING PROGRAM

## 2024-10-01 RX ORDER — LOSARTAN POTASSIUM 25 MG/1
25 TABLET ORAL DAILY
Qty: 90 TABLET | Refills: 3 | Status: SHIPPED | OUTPATIENT
Start: 2024-10-01

## 2024-10-01 RX ORDER — CARVEDILOL 12.5 MG/1
12.5 TABLET ORAL 2 TIMES DAILY
Qty: 180 TABLET | Refills: 3 | Status: SHIPPED | OUTPATIENT
Start: 2024-10-01

## 2024-10-01 NOTE — PROGRESS NOTES
[facility]  [address], [address]  [place] [address]    Office [phone],Fax [phone]           Shayne Birch is a 72 y.o. male presents for f/up visit    Assessment/Recommendations:     Diagnosis Orders   1. Coronary artery disease involving native coronary artery of native heart without angina pectoris  carvedilol (COREG) 12.5 MG tablet    losartan (COZAAR) 25 MG tablet    CBC    Lipid Panel    Comprehensive Metabolic Panel      2. Essential (primary) hypertension  carvedilol (COREG) 12.5 MG tablet    losartan (COZAAR) 25 MG tablet    CBC    Lipid Panel    Comprehensive Metabolic Panel      3. SSS (sick sinus syndrome) ([facility])        4. Cardiac pacemaker in situ        5. LV dysfunction              Coronary artery disease status post CABG in 1998. LIMA-LAD, SVG-Diag, SVG-OM.  PCI 3/2024  distal left main into diagonal, treated with IVL and NESHA.  Echocardiogram January 2024 showed mild LV dysfunction  - aspirin dose to 81 mg daily  - high dose statin therapy  - Repeat echocardiogram in approximately 6 months    Hx of syncope.  Event monitor with ~ 3 sec pause.  Sick sinus syndrome.  s/p Medtronic dual chamber pacemaker (DOI 06/10/2024     Hyperlipidemia-continue atorvastatin 80 mg daily    Hypertension, in the setting of mild LV dysfunction recommend to discontinue his hydrochlorothiazide and amlodipine.  Recommend to initiate carvedilol 12.5 mg twice daily along with losartan 25 mg daily.          Primary Care Physician- Milind Rodriguez MD    Follow-up 6 to 8 weeks to follow-up blood pressure      Subjective:    Clinically doing well.  Significant increase in energy level since placement of his permanent pacemaker.  No ongoing chest pain or chest pressure symptoms.  No exertional dyspnea.    Past Medical History:   Diagnosis Date    Cancer (HCC)     melanoma.     Coronary artery disease 1/24/2011    Dyslipidemia     Essential

## 2024-10-13 PROCEDURE — 93294 REM INTERROG EVL PM/LDLS PM: CPT | Performed by: INTERNAL MEDICINE

## 2024-10-16 DIAGNOSIS — I10 ESSENTIAL (PRIMARY) HYPERTENSION: ICD-10-CM

## 2024-10-16 DIAGNOSIS — I25.10 CORONARY ARTERY DISEASE INVOLVING NATIVE CORONARY ARTERY OF NATIVE HEART WITHOUT ANGINA PECTORIS: ICD-10-CM

## 2024-10-16 LAB
ALBUMIN SERPL-MCNC: 3.7 G/DL (ref 3.5–5)
ALBUMIN/GLOB SERPL: 1.2 (ref 1.1–2.2)
ALP SERPL-CCNC: 85 U/L (ref 45–117)
ALT SERPL-CCNC: 41 U/L (ref 12–78)
ANION GAP SERPL CALC-SCNC: 3 MMOL/L (ref 2–12)
AST SERPL-CCNC: 28 U/L (ref 15–37)
BILIRUB SERPL-MCNC: 1.7 MG/DL (ref 0.2–1)
BUN SERPL-MCNC: 17 MG/DL (ref 6–20)
BUN/CREAT SERPL: 15 (ref 12–20)
CALCIUM SERPL-MCNC: 8.8 MG/DL (ref 8.5–10.1)
CHLORIDE SERPL-SCNC: 107 MMOL/L (ref 97–108)
CHOLEST SERPL-MCNC: 162 MG/DL
CO2 SERPL-SCNC: 27 MMOL/L (ref 21–32)
CREAT SERPL-MCNC: 1.11 MG/DL (ref 0.7–1.3)
ERYTHROCYTE [DISTWIDTH] IN BLOOD BY AUTOMATED COUNT: 13.2 % (ref 11.5–14.5)
GLOBULIN SER CALC-MCNC: 3.2 G/DL (ref 2–4)
GLUCOSE SERPL-MCNC: 97 MG/DL (ref 65–100)
HCT VFR BLD AUTO: 42.5 % (ref 36.6–50.3)
HDLC SERPL-MCNC: 87 MG/DL
HDLC SERPL: 1.9 (ref 0–5)
HGB BLD-MCNC: 13.8 G/DL (ref 12.1–17)
LDLC SERPL CALC-MCNC: 56.8 MG/DL (ref 0–100)
MCH RBC QN AUTO: 31.2 PG (ref 26–34)
MCHC RBC AUTO-ENTMCNC: 32.5 G/DL (ref 30–36.5)
MCV RBC AUTO: 96.2 FL (ref 80–99)
NRBC # BLD: 0 K/UL (ref 0–0.01)
NRBC BLD-RTO: 0 PER 100 WBC
PLATELET # BLD AUTO: 152 K/UL (ref 150–400)
PMV BLD AUTO: 11.7 FL (ref 8.9–12.9)
POTASSIUM SERPL-SCNC: 4.8 MMOL/L (ref 3.5–5.1)
PROT SERPL-MCNC: 6.9 G/DL (ref 6.4–8.2)
RBC # BLD AUTO: 4.42 M/UL (ref 4.1–5.7)
SODIUM SERPL-SCNC: 137 MMOL/L (ref 136–145)
TRIGL SERPL-MCNC: 91 MG/DL
VLDLC SERPL CALC-MCNC: 18.2 MG/DL
WBC # BLD AUTO: 7.4 K/UL (ref 4.1–11.1)

## 2024-11-12 ENCOUNTER — OFFICE VISIT (OUTPATIENT)
Age: 74
End: 2024-11-12
Payer: MEDICARE

## 2024-11-12 VITALS
HEIGHT: 67 IN | RESPIRATION RATE: 18 BRPM | BODY MASS INDEX: 35.16 KG/M2 | HEART RATE: 62 BPM | DIASTOLIC BLOOD PRESSURE: 64 MMHG | SYSTOLIC BLOOD PRESSURE: 128 MMHG | WEIGHT: 224 LBS | OXYGEN SATURATION: 97 %

## 2024-11-12 DIAGNOSIS — I10 ESSENTIAL (PRIMARY) HYPERTENSION: ICD-10-CM

## 2024-11-12 DIAGNOSIS — I51.9 LV DYSFUNCTION: ICD-10-CM

## 2024-11-12 DIAGNOSIS — E78.2 MIXED HYPERLIPIDEMIA: ICD-10-CM

## 2024-11-12 DIAGNOSIS — I49.5 SSS (SICK SINUS SYNDROME) (HCC): ICD-10-CM

## 2024-11-12 DIAGNOSIS — Z95.0 CARDIAC PACEMAKER IN SITU: ICD-10-CM

## 2024-11-12 DIAGNOSIS — I25.10 CORONARY ARTERY DISEASE INVOLVING NATIVE CORONARY ARTERY OF NATIVE HEART WITHOUT ANGINA PECTORIS: Primary | ICD-10-CM

## 2024-11-12 PROCEDURE — 99214 OFFICE O/P EST MOD 30 MIN: CPT

## 2024-11-12 RX ORDER — AMLODIPINE BESYLATE 10 MG/1
10 TABLET ORAL DAILY
Qty: 90 TABLET | Refills: 3 | Status: SHIPPED
Start: 2024-11-12

## 2024-11-12 RX ORDER — HYDROCHLOROTHIAZIDE 25 MG/1
25 TABLET ORAL DAILY
Qty: 90 TABLET | Refills: 3 | Status: SHIPPED
Start: 2024-11-12

## 2024-11-12 RX ORDER — HYDROCHLOROTHIAZIDE 25 MG/1
25 TABLET ORAL DAILY
COMMUNITY
End: 2024-11-12 | Stop reason: SDUPTHER

## 2024-11-12 RX ORDER — AMLODIPINE BESYLATE 10 MG/1
10 TABLET ORAL DAILY
COMMUNITY
End: 2024-11-12 | Stop reason: SDUPTHER

## 2024-11-12 NOTE — PROGRESS NOTES
Chief Complaint   Patient presents with    Hypertension    Follow-up         Chest Pain  No    Last Lipids   Lab Results   Component Value Date    CHOL 162 10/16/2024    TRIG 91 10/16/2024    HDL 87 10/16/2024    VLDL 18.2 10/16/2024    CHOLHDLRATIO 1.9 10/16/2024      Unable to tolerate carvedilol and cozaar due to fatigue, stayed on for 7-10 days.  Went back to amlodipine and hctz about 2 weeks ago.      Refills    /64 (Site: Left Upper Arm, Position: Sitting, Cuff Size: Medium Adult)   Pulse 62   Resp 18   Ht 1.702 m (5' 7\")   Wt 101.6 kg (224 lb)   SpO2 97%   BMI 35.08 kg/m²       Have you been to the ER, urgent care clinic since your last visit? No  Hospitalized since your last visit? NO    2. Have you seen or consulted with any other health care providers outside of the [facility] since your last visit?  No    
Normal cavity size and systolic function (ejection fraction normal).   Increased wall thickness. Calculated left ventricular ejection fraction is   55%. Biplane method used to measure ejection fraction. Moderate (grade 2)   left ventricular diastolic dysfunction.  · Mildly dilated right ventricle.  · Aortic valve leaflet calcification present w/o stenosis. Trace aortic   valve regurgitation.  · Mitral valve thickening. Mild mitral valve regurgitation is present.  · Mildly dilated left atrium.  · Mild pulmonic valve regurgitation is present.  · Pulmonary arterial systolic pressure is 17.5 mmHg.        Signed by: Rogelio Casey DO     01/31/24    ECHO (TTE) COMPLETE (PRN CONTRAST/BUBBLE/STRAIN/3D) 01/31/2024  4:57 PM (Final)    Interpretation Summary    Left Ventricle: Mildly reduced left ventricular systolic function with a visually estimated EF of 45 - 50%. EF by 2D Simpsons Biplane is 44%. Left ventricle is mildly dilated. Mild septal thickening. Mild global hypokinesis present. Normal diastolic function.    Right Ventricle: Right ventricle is mildly dilated.    Aortic Valve: Mildly thickened cusp. Mildly calcified cusp. Mild stenosis of the aortic valve. AV mean gradient is 12 mmHg. AV peak velocity is 2.3 m/s. AV area by continuity VTI is 2.1 cm2.    Mitral Valve: Mild regurgitation.    Tricuspid Valve: Mild regurgitation.    Left Atrium: Left atrium is mildly dilated. LA Vol Index A/L is 39 mL/m2.    Image quality is good.    Signed by: Rogelio Casey DO on 1/31/2024  4:57 PM    Cath 2/2024    Severe multivessel CAD    Severe sequential stenosis of proximal lad into diagonal with subtotally occluded lad distal to diagonal that fills via lima    Lcx system appears to have severe negatively remodeling with myocardial perfusion imaging indicating infarction    Occluded vein grafts x 2     Findings:   L Main:  appears to be short, large caliber without significant disease  LAD: large caliber, proximal calcified 90%

## 2025-01-06 DIAGNOSIS — I25.118 CORONARY ARTERY DISEASE OF NATIVE ARTERY OF NATIVE HEART WITH STABLE ANGINA PECTORIS (HCC): ICD-10-CM

## 2025-01-06 RX ORDER — ATORVASTATIN CALCIUM 80 MG/1
80 TABLET, FILM COATED ORAL DAILY
Qty: 90 TABLET | Refills: 2 | Status: SHIPPED | OUTPATIENT
Start: 2025-01-06

## 2025-01-09 RX ORDER — HYDROCHLOROTHIAZIDE 25 MG/1
25 TABLET ORAL DAILY
Qty: 90 TABLET | Refills: 3 | Status: SHIPPED | OUTPATIENT
Start: 2025-01-09

## 2025-01-09 NOTE — TELEPHONE ENCOUNTER
Refill per VO of Dr. TANYA Casey, OD:    Last appt:  11/12/2024    Future Appointments   Date Time Provider Department Center   5/13/2025  9:00 AM [facility]   5/13/2025 10:00 AM Rogelio Casey DO [facility]   7/25/2025  9:20 AM [facility], [facility]   7/25/2025  9:40 AM Fidelia Hill MD [facility]       Requested Prescriptions     Pending Prescriptions Disp Refills    hydroCHLOROthiazide (HYDRODIURIL) 25 MG tablet 90 tablet 3     Sig: Take 1 tablet by mouth daily

## 2025-03-17 ENCOUNTER — TRANSCRIBE ORDERS (OUTPATIENT)
Facility: HOSPITAL | Age: 75
End: 2025-03-17

## 2025-03-17 DIAGNOSIS — R05.3 CHRONIC COUGH: Primary | ICD-10-CM

## 2025-03-24 RX ORDER — AMLODIPINE BESYLATE 10 MG/1
10 TABLET ORAL DAILY
Qty: 90 TABLET | Refills: 2 | Status: SHIPPED | OUTPATIENT
Start: 2025-03-24

## 2025-03-24 NOTE — TELEPHONE ENCOUNTER
Refill per VO of Dr. TANYA Casey, OD:    Last appt:  11/12/2024    Future Appointments   Date Time Provider Department Center   5/13/2025  9:00 [facility]   5/13/2025 10:00 AM Rogelio Casey DO [facility]   7/25/2025  9:20 AM PACEMAKER, [facility]   7/25/2025  9:40 AM Fidelia Hill MD [facility] AMB       Requested Prescriptions     Pending Prescriptions Disp Refills    amLODIPine (NORVASC) 10 MG tablet [Pharmacy Med Name: amLODIPine BESYLATE 10MG TAB] 90 tablet 3     Sig: TAKE 1 TABLET BY MOUTH DAILY

## 2025-05-08 PROCEDURE — 93294 REM INTERROG EVL PM/LDLS PM: CPT | Performed by: INTERNAL MEDICINE

## 2025-05-13 ENCOUNTER — ANCILLARY PROCEDURE (OUTPATIENT)
Age: 75
End: 2025-05-13
Payer: MEDICARE

## 2025-05-13 ENCOUNTER — RESULTS FOLLOW-UP (OUTPATIENT)
Age: 75
End: 2025-05-13

## 2025-05-13 VITALS
SYSTOLIC BLOOD PRESSURE: 132 MMHG | HEIGHT: 67 IN | BODY MASS INDEX: 35.31 KG/M2 | HEART RATE: 65 BPM | DIASTOLIC BLOOD PRESSURE: 72 MMHG | WEIGHT: 225 LBS

## 2025-05-13 DIAGNOSIS — I10 ESSENTIAL (PRIMARY) HYPERTENSION: ICD-10-CM

## 2025-05-13 DIAGNOSIS — I25.10 CORONARY ARTERY DISEASE INVOLVING NATIVE CORONARY ARTERY OF NATIVE HEART WITHOUT ANGINA PECTORIS: ICD-10-CM

## 2025-05-13 DIAGNOSIS — Z95.0 CARDIAC PACEMAKER IN SITU: ICD-10-CM

## 2025-05-13 DIAGNOSIS — I51.9 LV DYSFUNCTION: ICD-10-CM

## 2025-05-13 DIAGNOSIS — I49.5 SSS (SICK SINUS SYNDROME) (HCC): ICD-10-CM

## 2025-05-13 DIAGNOSIS — E78.2 MIXED HYPERLIPIDEMIA: ICD-10-CM

## 2025-05-13 LAB
ECHO AO ROOT DIAM: 3.1 CM
ECHO AO ROOT INDEX: 1.46 CM/M2
ECHO AV MEAN GRADIENT: 9 MMHG
ECHO AV MEAN VELOCITY: 1.4 M/S
ECHO AV PEAK GRADIENT: 14 MMHG
ECHO AV PEAK VELOCITY: 1.9 M/S
ECHO AV VELOCITY RATIO: 0.53
ECHO AV VTI: 45 CM
ECHO BSA: 2.2 M2
ECHO EST RA PRESSURE: 3 MMHG
ECHO LA DIAMETER INDEX: 2.16 CM/M2
ECHO LA DIAMETER: 4.6 CM
ECHO LA TO AORTIC ROOT RATIO: 1.48
ECHO LV E' LATERAL VELOCITY: 7.94 CM/S
ECHO LV E' SEPTAL VELOCITY: 6.24 CM/S
ECHO LV EDV A2C: 138 ML
ECHO LV EDV A4C: 122 ML
ECHO LV EDV BP: 134 ML (ref 67–155)
ECHO LV EDV INDEX A4C: 57 ML/M2
ECHO LV EDV INDEX BP: 63 ML/M2
ECHO LV EDV NDEX A2C: 65 ML/M2
ECHO LV EF PHYSICIAN: 51 %
ECHO LV EJECTION FRACTION A2C: 49 %
ECHO LV EJECTION FRACTION A4C: 53 %
ECHO LV EJECTION FRACTION BIPLANE: 51 % (ref 55–100)
ECHO LV ESV A2C: 71 ML
ECHO LV ESV A4C: 57 ML
ECHO LV ESV BP: 66 ML (ref 22–58)
ECHO LV ESV INDEX A2C: 33 ML/M2
ECHO LV ESV INDEX A4C: 27 ML/M2
ECHO LV ESV INDEX BP: 31 ML/M2
ECHO LV FRACTIONAL SHORTENING: 27 % (ref 28–44)
ECHO LV INTERNAL DIMENSION DIASTOLE INDEX: 2.58 CM/M2
ECHO LV INTERNAL DIMENSION DIASTOLIC: 5.5 CM (ref 4.2–5.9)
ECHO LV INTERNAL DIMENSION SYSTOLIC INDEX: 1.88 CM/M2
ECHO LV INTERNAL DIMENSION SYSTOLIC: 4 CM
ECHO LV IVSD: 1.3 CM (ref 0.6–1)
ECHO LV MASS 2D: 257 G (ref 88–224)
ECHO LV MASS INDEX 2D: 120.7 G/M2 (ref 49–115)
ECHO LV POSTERIOR WALL DIASTOLIC: 1 CM (ref 0.6–1)
ECHO LV RELATIVE WALL THICKNESS RATIO: 0.36
ECHO LVOT AV VTI INDEX: 0.54
ECHO LVOT MEAN GRADIENT: 2 MMHG
ECHO LVOT PEAK GRADIENT: 4 MMHG
ECHO LVOT PEAK VELOCITY: 1 M/S
ECHO LVOT VTI: 24.1 CM
ECHO MV A VELOCITY: 0.53 M/S
ECHO MV AREA PHT: 4.1 CM2
ECHO MV E DECELERATION TIME (DT): 183.5 MS
ECHO MV E VELOCITY: 1.15 M/S
ECHO MV E/A RATIO: 2.17
ECHO MV E/E' LATERAL: 14.48
ECHO MV E/E' RATIO (AVERAGED): 16.46
ECHO MV E/E' SEPTAL: 18.43
ECHO MV PRESSURE HALF TIME (PHT): 53.2 MS

## 2025-05-13 PROCEDURE — C8929 TTE W OR WO FOL WCON,DOPPLER: HCPCS | Performed by: STUDENT IN AN ORGANIZED HEALTH CARE EDUCATION/TRAINING PROGRAM

## 2025-05-13 RX ADMIN — PERFLUTREN 10 ML: 6.52 INJECTION, SUSPENSION INTRAVENOUS at 09:51

## 2025-05-13 NOTE — RESULT ENCOUNTER NOTE
Dear  Queta,  Good News!  Your test results show some improvement in your pumping function!  Please continue the current treatment plan.  We can discuss more at your scheduled follow up if you have questions.  Best Regards,  TONG Luo NP

## 2025-07-25 ENCOUNTER — PROCEDURE VISIT (OUTPATIENT)
Age: 75
End: 2025-07-25
Payer: MEDICARE

## 2025-07-25 ENCOUNTER — OFFICE VISIT (OUTPATIENT)
Age: 75
End: 2025-07-25
Payer: MEDICARE

## 2025-07-25 VITALS
HEART RATE: 62 BPM | HEIGHT: 67 IN | OXYGEN SATURATION: 95 % | WEIGHT: 227 LBS | BODY MASS INDEX: 35.63 KG/M2 | DIASTOLIC BLOOD PRESSURE: 84 MMHG | SYSTOLIC BLOOD PRESSURE: 120 MMHG

## 2025-07-25 DIAGNOSIS — R55 SYNCOPE AND COLLAPSE: Primary | ICD-10-CM

## 2025-07-25 DIAGNOSIS — E78.5 DYSLIPIDEMIA: ICD-10-CM

## 2025-07-25 DIAGNOSIS — I10 ESSENTIAL HYPERTENSION, BENIGN: ICD-10-CM

## 2025-07-25 DIAGNOSIS — I25.118 CORONARY ARTERY DISEASE OF NATIVE ARTERY OF NATIVE HEART WITH STABLE ANGINA PECTORIS: ICD-10-CM

## 2025-07-25 DIAGNOSIS — Z95.0 PACEMAKER: Primary | ICD-10-CM

## 2025-07-25 DIAGNOSIS — Z95.0 PACEMAKER: ICD-10-CM

## 2025-07-25 PROCEDURE — 3074F SYST BP LT 130 MM HG: CPT | Performed by: INTERNAL MEDICINE

## 2025-07-25 PROCEDURE — 3079F DIAST BP 80-89 MM HG: CPT | Performed by: INTERNAL MEDICINE

## 2025-07-25 PROCEDURE — 1036F TOBACCO NON-USER: CPT | Performed by: INTERNAL MEDICINE

## 2025-07-25 PROCEDURE — 99214 OFFICE O/P EST MOD 30 MIN: CPT | Performed by: INTERNAL MEDICINE

## 2025-07-25 PROCEDURE — G8417 CALC BMI ABV UP PARAM F/U: HCPCS | Performed by: INTERNAL MEDICINE

## 2025-07-25 PROCEDURE — G8427 DOCREV CUR MEDS BY ELIG CLIN: HCPCS | Performed by: INTERNAL MEDICINE

## 2025-07-25 PROCEDURE — G2211 COMPLEX E/M VISIT ADD ON: HCPCS | Performed by: INTERNAL MEDICINE

## 2025-07-25 PROCEDURE — 1126F AMNT PAIN NOTED NONE PRSNT: CPT | Performed by: INTERNAL MEDICINE

## 2025-07-25 PROCEDURE — 1160F RVW MEDS BY RX/DR IN RCRD: CPT | Performed by: INTERNAL MEDICINE

## 2025-07-25 PROCEDURE — 93280 PM DEVICE PROGR EVAL DUAL: CPT | Performed by: INTERNAL MEDICINE

## 2025-07-25 PROCEDURE — 1159F MED LIST DOCD IN RCRD: CPT | Performed by: INTERNAL MEDICINE

## 2025-07-25 PROCEDURE — 1123F ACP DISCUSS/DSCN MKR DOCD: CPT | Performed by: INTERNAL MEDICINE

## 2025-07-25 NOTE — PROGRESS NOTES
Chief Complaint   Patient presents with    SSS    Cardiomyopathy     Vitals:    07/25/25 0928   BP: 120/84   BP Site: Left Upper Arm   Patient Position: Sitting   Pulse: 62   SpO2: 95%   Weight: 103 kg (227 lb)   Height: 1.702 m (5' 7\")         Chest pain: DENIED     Recent hospital stays: DENIED     Refills: DENIED

## 2025-07-25 NOTE — PROGRESS NOTES
Cardiac Electrophysiology OFFICE Follow-up Note       Assessment/Plan:   1. Syncope and collapse  2. Dyslipidemia  3. Essential hypertension, benign  4. Coronary artery disease of native artery of native heart with stable angina pectoris  5. Pacemaker         Primary Cardiologist: Jacinto      Syncope and collapse:   History of kareen syncope without prodrome. Not on BB at the time. Had trauma to his head. underwent Holter monitor after syncopal episode that showed pause of 2.9 seconds, 1st degree AVB also noted. HR as low as 27 bpm  - evidence of SSS and AV jacinda disease. Likely could benefit from BB in the future for CAD. He ultimately would benefit from PPM implantation.  -s/p dual chamber MDT PPM (6/10/24-Hill)  - Normal device interrogation as noted below  - no syncope or dizziness, doing well  - FU with EP in 1 year    Pacemaker  Date of implant 6/10/2024  Medtronic dual chamber pacemaker with normal function. Battery life 12.6 years. No new or significant lead issues requiring intervention. No significant arrhythmias noted requiring intervention. Iterative programing was performed to assess lead parameters without any permanent changes.  Atrial pacing 80%, ventricular pacing 4.8%.  No atrial fibrillation or events  - Remote transmission every 3 months    Hypertension:   BP is well controlled on the current regimen. No change in antihypertensive medications today. Recommended routine exercise and low sodium diet.  - Continue amlodipine, HCTZ    CAD s/p CABG 90's:   underwent C 3/2024 with NESHA to LAD. Continue lipitor, plavix, and aspirin.    Patient is an established patient with plan for longitudinal relationship and ongoing assessment and management of arrhythmias recurrence, monitor, labs, remote transmission, device management as a focal point of continued medical care.   - FU in EP in 1 year with Q3 months remote transmissions      Subjective:       Shayne GRAHAM Queta is a 73 y.o. patient who is seen

## 2025-07-25 NOTE — PATIENT INSTRUCTIONS
Goal BP<130/90 mmHg    Continue remote transmission every 3 months  FU with NP in 1 year  FU with Dr. Hill in 2 years

## (undated) DEVICE — 3M™ IOBAN™ 2 ANTIMICROBIAL INCISE DRAPE 6640EZ: Brand: IOBAN™ 2

## (undated) DEVICE — CATH IV AUTOGRD BC BLU 22GA 25 -- INSYTE

## (undated) DEVICE — 3M™ CUROS™ DISINFECTING CAP FOR NEEDLELESS CONNECTORS 270/CARTON 20 CARTONS/CASE CFF1-270: Brand: CUROS™

## (undated) DEVICE — COVER PROBE ULTRASOUND ULTRACOVER 6IN X 48IN

## (undated) DEVICE — SYR 5ML 1/5 GRAD LL NSAF LF --

## (undated) DEVICE — SLING ORTHOPEDIC PCH UNIV 19.5X9 IN 2-39 IN ARM W/ FOAM STRP

## (undated) DEVICE — KENDALL DL ECG DUAL CONNECT RADIOLUCENT LEAD WIRES, 5-LEAD, SINGLE PATIENT USE: Brand: KENDALL

## (undated) DEVICE — SUTURE PERMA-HAND SZ 0 L30IN NONABSORBABLE BLK L36MM CT-1 424H

## (undated) DEVICE — CUFF RMFG BP INF SZ 11 DISP -- LAWSON OEM ITEM 238915

## (undated) DEVICE — HEART CATH-SFMC: Brand: MEDLINE INDUSTRIES, INC.

## (undated) DEVICE — GUIDEWIRE VASC L180CM DIA0.035IN 3MM PTFE J TIP EXCHG FIX

## (undated) DEVICE — CATH BLLN ANGIO 3.75X8MM NC EUPHORIA RX

## (undated) DEVICE — BAND COMPR L24CM REG CLR PLAS HEMSTAT EXT HK AND LOOP RETEN

## (undated) DEVICE — CATH IV AUTOGRD BC PNK 20GA 25 -- INSYTE

## (undated) DEVICE — PACEMAKER PACK: Brand: MEDLINE INDUSTRIES, INC.

## (undated) DEVICE — BLUNTFILL WITH FILTER: Brand: MONOJECT

## (undated) DEVICE — POLYP TRAP: Brand: TRAPEASE®

## (undated) DEVICE — VALVE HEMSTAT 8FR INNR LUMN CRSS SLT SEAL DSGN WATCHDOG

## (undated) DEVICE — KIT COLON W/ 1.1OZ LUB AND 2 END

## (undated) DEVICE — SYR 3ML LL TIP 1/10ML GRAD --

## (undated) DEVICE — ELECTRODE PT RET AD L9FT HI MOIST COND ADH HYDRGEL CORDED

## (undated) DEVICE — BAG SPEC BIOHZRD 10 X 10 IN --

## (undated) DEVICE — SUPPORT WRST AD W3.5XL9IN DIA14.5IN ART SFT ADJ HK AND LOOP

## (undated) DEVICE — SNARE ENDOSCP M L240CM W27MM SHTH DIA2.4MM CHN 2.8MM OVL

## (undated) DEVICE — MICROPUNCTURE INTRODUCER SET SILHOUETTE TRANSITIONLESS WITH NITINOL WIRE GUIDE: Brand: MICROPUNCTURE

## (undated) DEVICE — DEVICE INFL 20ML 30ATM DGT FLD DISPNS SYR W ACCESSPLUS BLU

## (undated) DEVICE — STRIP,CLOSURE,WOUND,MEDI-STRIP,1/2X4: Brand: MEDLINE

## (undated) DEVICE — Device: Brand: ASAHI SION BLUE

## (undated) DEVICE — ARGO BAGZ FLUID MANAGEMENT SYSTEM: Brand: ARGO BAGZ FLUID MANAGEMENT SYSTEM

## (undated) DEVICE — CATHETER GUID 6FR L100CM DIA0.071IN NYL SHFT EBU3.5

## (undated) DEVICE — GLIDESHEATH SLENDER STAINLESS STEEL KIT: Brand: GLIDESHEATH SLENDER

## (undated) DEVICE — TUBING PRSS MON L12IN PVC RIG NONEXPANDING M TO FEM CONN

## (undated) DEVICE — CATHETER GUID JR4 5 FRX100 CM SM ATRAUM SFT CONVEY

## (undated) DEVICE — RADIFOCUS OPTITORQUE ANGIOGRAPHIC CATHETER: Brand: OPTITORQUE

## (undated) DEVICE — SUTURE ABSORBABLE ANTIBACT 1-0 CT-1 24 IN STRATAFIX PDS + SXPP1A443

## (undated) DEVICE — CATH BLLN ANGIO 2.50X15MM SC EUPHORA RX

## (undated) DEVICE — BASIN EMSIS 16OZ GRAPHITE PLAS KID SHP MOLD GRAD FOR ORAL

## (undated) DEVICE — INTRODUCER SHTH L13CM OD7FR SH ORNG HUB SEAMLESS SAFSHTH

## (undated) DEVICE — CONTAINER SPEC 20 ML LID NEUT BUFF FORMALIN 10 % POLYPR STS

## (undated) DEVICE — SET GRAV CK VLV NEEDLESS ST 3 GANGED 4WAY STPCOCK HI FLO 10

## (undated) DEVICE — BLUNTFILL: Brand: MONOJECT

## (undated) DEVICE — BAG BELONG PT PERS CLEAR HANDL

## (undated) DEVICE — Device: Brand: EAGLE EYE PLATINUM RX DIGITAL IVUS CATHETER

## (undated) DEVICE — SUTURE MONOCRYL STRATAFIX SPRL + SZ 4-0 L12IN ABSRB UD PS-2 SXMP1B117

## (undated) DEVICE — CATHETER GUID JL4 5 FRX100 CM SM ATRAUM SFT CONVEY

## (undated) DEVICE — 1200 GUARD II KIT W/5MM TUBE W/O VAC TUBE: Brand: GUARDIAN

## (undated) DEVICE — CANN NASAL O2 CAPNOGRAPHY AD -- FILTERLINE

## (undated) DEVICE — DRESSING ANTIMIC FOAM OPTIFOAM POSTOP ADH 4 X 6 IN

## (undated) DEVICE — (D)SENSOR RMFG 02 PULS OXMTR -- DISC BY MFR USE ITEM 133445

## (undated) DEVICE — CATH BLLN ANGIO 3.25X12MM NC EUPHORIA RX

## (undated) DEVICE — Device

## (undated) DEVICE — ELECTRODE,RADIOTRANSLUCENT,FOAM,3PK: Brand: MEDLINE

## (undated) DEVICE — SOLIDIFIER MEDC 1200ML -- CONVERT TO 356117

## (undated) DEVICE — ADULT SPO2 SENSOR,REMANUFACTURED,REPROCESSED DEVICE FOR SINGLE USE; REPROCESSED BY COVIDIEN LLC: Brand: NELLCOR

## (undated) DEVICE — CATH LITHOPLSTY 3X12MM SHOCKWAVE

## (undated) DEVICE — SUTURE MNFLMNT SH 26 MM 1/2 CI CRCLE TPR PNT SYMTRC PD PLU